# Patient Record
Sex: MALE | Race: WHITE | NOT HISPANIC OR LATINO | Employment: OTHER | URBAN - METROPOLITAN AREA
[De-identification: names, ages, dates, MRNs, and addresses within clinical notes are randomized per-mention and may not be internally consistent; named-entity substitution may affect disease eponyms.]

---

## 2022-02-28 ENCOUNTER — OFFICE VISIT (OUTPATIENT)
Dept: FAMILY MEDICINE | Facility: CLINIC | Age: 52
End: 2022-02-28
Payer: MEDICARE

## 2022-02-28 VITALS
OXYGEN SATURATION: 99 % | RESPIRATION RATE: 18 BRPM | TEMPERATURE: 99 F | SYSTOLIC BLOOD PRESSURE: 110 MMHG | WEIGHT: 169 LBS | HEIGHT: 64 IN | HEART RATE: 66 BPM | BODY MASS INDEX: 28.85 KG/M2 | DIASTOLIC BLOOD PRESSURE: 70 MMHG

## 2022-02-28 DIAGNOSIS — R41.3 MEMORY IMPAIRMENT: ICD-10-CM

## 2022-02-28 DIAGNOSIS — Z79.899 ON LONG TERM DRUG THERAPY: ICD-10-CM

## 2022-02-28 DIAGNOSIS — K08.9 POOR DENTITION: ICD-10-CM

## 2022-02-28 DIAGNOSIS — E78.5 HYPERLIPIDEMIA, UNSPECIFIED HYPERLIPIDEMIA TYPE: ICD-10-CM

## 2022-02-28 DIAGNOSIS — Z12.11 SCREENING FOR MALIGNANT NEOPLASM OF COLON: ICD-10-CM

## 2022-02-28 DIAGNOSIS — Z00.00 WELLNESS EXAMINATION: Primary | ICD-10-CM

## 2022-02-28 DIAGNOSIS — Q90.9 DOWN SYNDROME: ICD-10-CM

## 2022-02-28 DIAGNOSIS — Z12.5 SCREENING FOR MALIGNANT NEOPLASM OF PROSTATE: ICD-10-CM

## 2022-02-28 PROCEDURE — 99386 PREV VISIT NEW AGE 40-64: CPT | Mod: S$GLB,,, | Performed by: FAMILY MEDICINE

## 2022-02-28 PROCEDURE — 99386 PR PREVENTIVE VISIT,NEW,40-64: ICD-10-PCS | Mod: S$GLB,,, | Performed by: FAMILY MEDICINE

## 2022-02-28 RX ORDER — ROSUVASTATIN CALCIUM 5 MG/1
TABLET, COATED ORAL
COMMUNITY
Start: 2022-02-18

## 2022-02-28 RX ORDER — ALLOPURINOL 100 MG/1
TABLET ORAL
COMMUNITY
Start: 2022-02-18

## 2022-02-28 RX ORDER — CHLORHEXIDINE GLUCONATE ORAL RINSE 1.2 MG/ML
15 SOLUTION DENTAL 2 TIMES DAILY
COMMUNITY

## 2022-02-28 NOTE — PROGRESS NOTES
Subjective:      Patient ID: John Guzmán is a 51 y.o. male.    Chief Complaint: Establish Care      HPI:  51-year-old white male past history of hyperlipidemia down syndrome who presents for initiation of care.  His previous doctor is retiring.  He needs to find a new doctor.  He is without complaints.  His mother does report some memory loss.  They have noticed some problems remembering over the last 3 years.  Will repeat himself.  He did not recognize her while he was at work recently.  Seems to be gradually getting worse.  He needs refill of some meds.  He thinks he takes allopurinol for kidney problems.  He has never had a colonoscopy.  He denies any problems urinating but mother reports he does not complain a lot.    Past Medical History:   Diagnosis Date    Gout, unspecified     Hyperlipidemia     Unspecified viral hepatitis B without hepatic coma      Past Surgical History:   Procedure Laterality Date    FINGER AMPUTATION       Family History   Problem Relation Age of Onset    No Known Problems Mother     No Known Problems Father      Social History     Socioeconomic History    Marital status: Single   Tobacco Use    Smoking status: Never Smoker    Smokeless tobacco: Never Used   Substance and Sexual Activity    Alcohol use: Never    Drug use: Never     Review of patient's allergies indicates:  No Known Allergies    Review of Systems   Constitutional: Negative for activity change, appetite change, chills, fatigue, fever and unexpected weight change.   HENT: Negative for congestion, ear pain, rhinorrhea, sinus pressure, sinus pain, sneezing, sore throat and trouble swallowing.    Eyes: Negative for photophobia, pain and itching.   Respiratory: Negative for cough, chest tightness, shortness of breath and wheezing.    Cardiovascular: Negative for chest pain, palpitations and leg swelling.   Gastrointestinal: Negative for abdominal distention, abdominal pain, constipation, diarrhea, nausea and  "vomiting.   Endocrine: Negative for cold intolerance, heat intolerance, polydipsia and polyphagia.   Genitourinary: Negative for difficulty urinating, dysuria and frequency.   Musculoskeletal: Negative for arthralgias, joint swelling and myalgias.   Skin: Negative for pallor and rash.   Neurological: Negative for dizziness, seizures, syncope, speech difficulty and headaches.   Hematological: Negative for adenopathy. Does not bruise/bleed easily.   Psychiatric/Behavioral: Positive for decreased concentration. Negative for agitation, behavioral problems and hallucinations.       Objective:       /70 (BP Location: Left arm, Patient Position: Sitting, BP Method: Large (Manual))   Pulse 66   Temp 98.6 °F (37 °C) (Oral)   Resp 18   Ht 5' 4" (1.626 m)   Wt 76.7 kg (169 lb)   SpO2 99%   BMI 29.01 kg/m²   Physical Exam  Vitals and nursing note reviewed.   Constitutional:       Appearance: He is well-developed.   HENT:      Head: Normocephalic and atraumatic.      Nose: Nose normal.   Eyes:      Conjunctiva/sclera: Conjunctivae normal.      Pupils: Pupils are equal, round, and reactive to light.   Cardiovascular:      Rate and Rhythm: Normal rate and regular rhythm.      Heart sounds: Normal heart sounds.   Pulmonary:      Effort: Pulmonary effort is normal.      Breath sounds: Normal breath sounds.   Abdominal:      Palpations: Abdomen is soft.      Tenderness: There is no abdominal tenderness.   Musculoskeletal:         General: Normal range of motion.      Cervical back: Normal range of motion and neck supple.   Skin:     General: Skin is warm and dry.   Neurological:      Mental Status: He is alert and oriented to person, place, and time.   Psychiatric:         Behavior: Behavior normal.         Thought Content: Thought content normal.         Judgment: Judgment normal.         Assessment:     1. Wellness examination    2. Down syndrome    3. Hyperlipidemia, unspecified hyperlipidemia type    4. Poor " dentition    5. Screening for malignant neoplasm of prostate    6. Screening for malignant neoplasm of colon    7. On long term drug therapy    8. Memory impairment        Plan:   Wellness examination    Down syndrome    Hyperlipidemia, unspecified hyperlipidemia type  -     Comprehensive Metabolic Panel; Future; Expected date: 02/28/2022  -     Lipid Panel; Future; Expected date: 02/28/2022    Poor dentition    Screening for malignant neoplasm of prostate  -     PSA, Screening; Future; Expected date: 02/28/2022    Screening for malignant neoplasm of colon  -     Ambulatory referral/consult to General Surgery; Future; Expected date: 03/07/2022    On long term drug therapy  -     CBC Auto Differential; Future; Expected date: 02/28/2022  -     TSH; Future; Expected date: 02/28/2022  -     T4, Free; Future; Expected date: 02/28/2022  -     Comprehensive Metabolic Panel; Future; Expected date: 02/28/2022  -     Lipid Panel; Future; Expected date: 02/28/2022  -     Hemoglobin A1C; Future; Expected date: 02/28/2022    Memory impairment  -     CT Head Without Contrast; Future; Expected date: 02/28/2022      Will request old records to see if patient will need an echocardiogram or x-rays of his neck.    Order CT head.      Consider Aricept.    Will come in at later date for fasting labs.    Medication List with Changes/Refills   Current Medications    ALLOPURINOL (ZYLOPRIM) 100 MG TABLET        CHLORHEXIDINE (PERIDEX) 0.12 % SOLUTION    Use as directed 15 mLs in the mouth or throat 2 (two) times daily.    ROSUVASTATIN (CRESTOR) 5 MG TABLET                Disclaimer: This note may have been prepared using voice recognition software, it may have not been extensively proofed, as such there could be errors within the text such as sound alike errors.

## 2022-04-04 ENCOUNTER — TELEPHONE (OUTPATIENT)
Dept: FAMILY MEDICINE | Facility: CLINIC | Age: 52
End: 2022-04-04
Payer: MEDICARE

## 2022-04-04 NOTE — TELEPHONE ENCOUNTER
----- Message from Nandini Humaira sent at 4/4/2022 12:19 PM CDT -----  Type:  Needs Medical Advice    Who Called: John Guzmán  Symptoms (please be specific): -  How long has patient had these symptoms:  -  Pharmacy name and phone #:    Would the patient rather a call back or a response via [facility]?    Best Call Back Number: [phone] ( Jojo. Pt's mother)   Additional Information: calling to see if pt's meds have been refilled, please call

## 2023-12-28 ENCOUNTER — OFFICE VISIT (OUTPATIENT)
Dept: FAMILY MEDICINE | Facility: CLINIC | Age: 53
End: 2023-12-28
Payer: MEDICARE

## 2023-12-28 VITALS
WEIGHT: 179 LBS | RESPIRATION RATE: 18 BRPM | TEMPERATURE: 99 F | OXYGEN SATURATION: 99 % | DIASTOLIC BLOOD PRESSURE: 78 MMHG | BODY MASS INDEX: 30.56 KG/M2 | HEIGHT: 64 IN | HEART RATE: 77 BPM | SYSTOLIC BLOOD PRESSURE: 130 MMHG

## 2023-12-28 DIAGNOSIS — R60.0 LOCALIZED EDEMA: Primary | ICD-10-CM

## 2023-12-28 DIAGNOSIS — S80.11XA CONTUSION OF RIGHT LOWER LEG, INITIAL ENCOUNTER: ICD-10-CM

## 2023-12-28 DIAGNOSIS — R21 RASH: ICD-10-CM

## 2023-12-28 DIAGNOSIS — Z79.899 ON LONG TERM DRUG THERAPY: ICD-10-CM

## 2023-12-28 DIAGNOSIS — Q90.9 DOWN SYNDROME: ICD-10-CM

## 2023-12-28 PROCEDURE — 99214 OFFICE O/P EST MOD 30 MIN: CPT | Mod: S$GLB,,, | Performed by: FAMILY MEDICINE

## 2023-12-28 PROCEDURE — 99214 PR OFFICE/OUTPT VISIT, EST, LEVL IV, 30-39 MIN: ICD-10-PCS | Mod: S$GLB,,, | Performed by: FAMILY MEDICINE

## 2023-12-28 RX ORDER — BETAMETHASONE VALERATE 1.2 MG/G
CREAM TOPICAL 2 TIMES DAILY
Qty: 45 G | Refills: 0 | Status: SHIPPED | OUTPATIENT
Start: 2023-12-28

## 2023-12-28 RX ORDER — CEPHALEXIN 500 MG/1
500 CAPSULE ORAL EVERY 8 HOURS
Qty: 21 CAPSULE | Refills: 0 | Status: SHIPPED | OUTPATIENT
Start: 2023-12-28

## 2023-12-28 NOTE — PROGRESS NOTES
Subjective:      Patient ID: John Guzmán is a 53 y.o. male.    Chief Complaint: Follow-up (Right lower leg discoloration)      HPI: 53-year-old male who presents for right lower extremity rash.  Has been present for several months.  Denies any pain.  Mother noticed it.  Thought the home was going to bring him but they did not.  They have noticed some swelling.  Located just over the distal leg but not the foot.    Past Medical History:   Diagnosis Date    Gout, unspecified     Hyperlipidemia     Unspecified viral hepatitis B without hepatic coma      Past Surgical History:   Procedure Laterality Date    FINGER AMPUTATION       Family History   Problem Relation Age of Onset    No Known Problems Mother     No Known Problems Father      Social History     Socioeconomic History    Marital status: Single   Tobacco Use    Smoking status: Never    Smokeless tobacco: Never   Substance and Sexual Activity    Alcohol use: Never    Drug use: Never     Review of patient's allergies indicates:  No Known Allergies    Review of Systems   Constitutional:  Negative for activity change, appetite change, chills, fatigue, fever and unexpected weight change.   HENT:  Negative for congestion, ear pain, rhinorrhea, sinus pressure, sinus pain, sneezing, sore throat and trouble swallowing.    Eyes:  Negative for photophobia, pain and itching.   Respiratory:  Negative for cough, chest tightness, shortness of breath and wheezing.    Cardiovascular:  Negative for chest pain, palpitations and leg swelling.   Gastrointestinal:  Negative for abdominal distention, abdominal pain, constipation, diarrhea, nausea and vomiting.   Endocrine: Negative for cold intolerance, heat intolerance, polydipsia and polyphagia.   Genitourinary:  Negative for difficulty urinating, dysuria and frequency.   Musculoskeletal:  Negative for arthralgias, joint swelling and myalgias.   Skin:  Positive for rash. Negative for pallor.   Neurological:  Negative for  "dizziness, seizures, syncope, speech difficulty and headaches.   Hematological:  Negative for adenopathy. Does not bruise/bleed easily.   Psychiatric/Behavioral:  Negative for agitation, behavioral problems and hallucinations.        Objective:       /78 (BP Location: Left arm, Patient Position: Sitting, BP Method: Medium (Manual))   Pulse 77   Temp 98.6 °F (37 °C) (Oral)   Resp 18   Ht 5' 4" (1.626 m)   Wt 81.2 kg (179 lb)   SpO2 99%   BMI 30.73 kg/m²   Physical Exam  Vitals and nursing note reviewed.   Constitutional:       Appearance: He is well-developed.   HENT:      Head: Normocephalic and atraumatic.      Nose: Nose normal.   Eyes:      Conjunctiva/sclera: Conjunctivae normal.      Pupils: Pupils are equal, round, and reactive to light.   Cardiovascular:      Rate and Rhythm: Normal rate and regular rhythm.      Heart sounds: Normal heart sounds.   Pulmonary:      Effort: Pulmonary effort is normal.      Breath sounds: Normal breath sounds.   Abdominal:      Palpations: Abdomen is soft.      Tenderness: There is no abdominal tenderness.   Musculoskeletal:         General: Normal range of motion.      Cervical back: Normal range of motion and neck supple.      Comments:   Mild swelling noted over distal right lower extremity, no swelling noted over foot, appears have venous stasis dermatitis with some varicose veins   Skin:     General: Skin is warm and dry.   Neurological:      Mental Status: He is alert and oriented to person, place, and time.   Psychiatric:         Behavior: Behavior normal.         Thought Content: Thought content normal.         Judgment: Judgment normal.         Assessment:     1. Localized edema    2. Contusion of right lower leg, initial encounter    3. Down syndrome    4. On long term drug therapy    5. Rash        Plan:   Localized edema  -     VAS US Venous Leg Right; Future    Contusion of right lower leg, initial encounter  -     X-Ray Tibia Fibula 2 View Right; " Future; Expected date: 12/28/2023    Down syndrome  -     Echo; Future    On long term drug therapy  -     Hemoglobin A1C; Future; Expected date: 12/28/2023  -     CBC Auto Differential; Future; Expected date: 12/28/2023  -     TSH; Future; Expected date: 12/28/2023  -     T4, Free; Future; Expected date: 12/28/2023  -     Comprehensive Metabolic Panel; Future; Expected date: 12/28/2023  -     Lipid Panel; Future; Expected date: 12/28/2023    Rash  -     cephALEXin (KEFLEX) 500 MG capsule; Take 1 capsule (500 mg total) by mouth every 8 (eight) hours.  Dispense: 21 capsule; Refill: 0  -     betamethasone valerate 0.1% (VALISONE) 0.1 % Crea; Apply topically 2 (two) times daily.  Dispense: 45 g; Refill: 0       Trial of Keflex and betamethasone.      Labs pending.      Order echocardiogram.      Normal tib-fib.      Send for varicose vein evaluation.      Stat ultrasound was negative for DVT.    Medication List with Changes/Refills   New Medications    BETAMETHASONE VALERATE 0.1% (VALISONE) 0.1 % CREA    Apply topically 2 (two) times daily.    CEPHALEXIN (KEFLEX) 500 MG CAPSULE    Take 1 capsule (500 mg total) by mouth every 8 (eight) hours.   Current Medications    ALLOPURINOL (ZYLOPRIM) 100 MG TABLET        CHLORHEXIDINE (PERIDEX) 0.12 % SOLUTION    Use as directed 15 mLs in the mouth or throat 2 (two) times daily.    ROSUVASTATIN (CRESTOR) 5 MG TABLET                Disclaimer: This note may have been prepared using voice recognition software, it may have not been extensively proofed, as such there could be errors within the text such as sound alike errors.

## 2023-12-29 LAB
ABS NRBC COUNT: 0 X 10 3/UL (ref 0–0.01)
ABSOLUTE BASOPHIL: 0.05 X 10 3/UL (ref 0–0.22)
ABSOLUTE EOSINOPHIL: 0 X 10 3/UL (ref 0.04–0.54)
ABSOLUTE IMMATURE GRAN: 0.01 X 10 3/UL (ref 0–0.04)
ABSOLUTE LYMPHOCYTE: 1.83 X 10 3/UL (ref 0.86–4.75)
ABSOLUTE MONOCYTE: 0.36 X 10 3/UL (ref 0.22–1.08)
ALBUMIN SERPL-MCNC: 4.2 G/DL (ref 3.5–5.2)
ALBUMIN/GLOB SERPL ELPH: 1.5 {RATIO} (ref 1–2.7)
ALP ISOS SERPL LEV INH-CCNC: 82 U/L (ref 40–130)
ALT (SGPT): 48 U/L (ref 0–41)
ANION GAP SERPL CALC-SCNC: 11 MMOL/L (ref 8–17)
AST SERPL-CCNC: 36 U/L (ref 0–40)
BASOPHILS NFR BLD: 1 % (ref 0.2–1.2)
BILIRUBIN, TOTAL: 0.33 MG/DL (ref 0–1.2)
BUN/CREAT SERPL: 13.1 (ref 6–20)
CALCIUM SERPL-MCNC: 9.2 MG/DL (ref 8.6–10.2)
CARBON DIOXIDE, CO2: 29 MMOL/L (ref 22–29)
CHLORIDE: 103 MMOL/L (ref 98–107)
CHOLEST SERPL-MSCNC: 163 MG/DL (ref 100–200)
CREAT SERPL-MCNC: 1.25 MG/DL (ref 0.7–1.2)
EOSINOPHIL NFR BLD: 0 % (ref 0.7–7)
ESTIMATED AVERAGE GLUCOSE: 124 MG/DL
GFR ESTIMATION: 68.43 ML/MIN/1.73M2
GLOBULIN: 2.8 G/DL (ref 1.5–4.5)
GLUCOSE: 97 MG/DL (ref 74–106)
HBA1C MFR BLD: 5.9 % (ref 4–6)
HCT VFR BLD AUTO: 46.2 % (ref 42–52)
HDLC SERPL-MCNC: 52 MG/DL
HGB BLD-MCNC: 15.2 G/DL (ref 14–18)
IMMATURE GRANULOCYTES: 0.2 % (ref 0–0.5)
LDL/HDL RATIO: 1.7 (ref 1–3)
LDLC SERPL CALC-MCNC: 85.8 MG/DL (ref 0–100)
LYMPHOCYTES NFR BLD: 37.7 % (ref 19.3–53.1)
MCH RBC QN AUTO: 32.9 PG (ref 27–32)
MCHC RBC AUTO-ENTMCNC: 32.9 G/DL (ref 32–36)
MCV RBC AUTO: 100 FL (ref 80–94)
MONOCYTES NFR BLD: 7.4 % (ref 4.7–12.5)
NEUTROPHILS # BLD AUTO: 2.61 X 10 3/UL (ref 2.15–7.56)
NEUTROPHILS NFR BLD: 53.7 % (ref 34–71.1)
NUCLEATED RED BLOOD CELLS: 0 /100 WBC (ref 0–0.2)
PLATELET # BLD AUTO: 122 X 10 3/UL (ref 135–400)
POTASSIUM: 4.6 MMOL/L (ref 3.5–5.1)
PROT SNV-MCNC: 7 G/DL (ref 6.4–8.3)
RBC # BLD AUTO: 4.62 X 10 6/UL (ref 4.7–6.1)
RDW-SD: 52.2 FL (ref 37–54)
SODIUM: 143 MMOL/L (ref 136–145)
T4, FREE: 0.5 NG/DL (ref 0.93–1.7)
TRIGL SERPL-MCNC: 126 MG/DL (ref 0–150)
TSH SERPL DL<=0.005 MIU/L-ACNC: 5.37 UIU/ML (ref 0.27–4.2)
UREA NITROGEN (BUN): 16.4 MG/DL (ref 6–20)
WBC # BLD: 4.86 X 10 3/UL (ref 4.3–10.8)

## 2024-01-04 DIAGNOSIS — E03.9 HYPOTHYROIDISM, UNSPECIFIED TYPE: Primary | ICD-10-CM

## 2024-01-04 RX ORDER — LEVOTHYROXINE SODIUM 25 UG/1
25 TABLET ORAL
Qty: 30 TABLET | Refills: 3 | Status: SHIPPED | OUTPATIENT
Start: 2024-01-04 | End: 2025-01-03

## 2024-01-19 ENCOUNTER — PATIENT MESSAGE (OUTPATIENT)
Dept: FAMILY MEDICINE | Facility: CLINIC | Age: 54
End: 2024-01-19
Payer: MEDICARE

## 2024-02-20 ENCOUNTER — OFFICE VISIT (OUTPATIENT)
Dept: FAMILY MEDICINE | Facility: CLINIC | Age: 54
End: 2024-02-20
Payer: MEDICARE

## 2024-02-20 VITALS
DIASTOLIC BLOOD PRESSURE: 78 MMHG | OXYGEN SATURATION: 97 % | HEART RATE: 76 BPM | WEIGHT: 181.19 LBS | SYSTOLIC BLOOD PRESSURE: 116 MMHG | HEIGHT: 64 IN | BODY MASS INDEX: 30.93 KG/M2

## 2024-02-20 DIAGNOSIS — E78.5 HYPERLIPIDEMIA, UNSPECIFIED HYPERLIPIDEMIA TYPE: ICD-10-CM

## 2024-02-20 DIAGNOSIS — R60.9 EDEMA, UNSPECIFIED TYPE: ICD-10-CM

## 2024-02-20 DIAGNOSIS — R21 RASH: Primary | ICD-10-CM

## 2024-02-20 DIAGNOSIS — E03.9 HYPOTHYROIDISM, UNSPECIFIED TYPE: ICD-10-CM

## 2024-02-20 PROCEDURE — 99214 OFFICE O/P EST MOD 30 MIN: CPT | Mod: S$GLB,,, | Performed by: FAMILY MEDICINE

## 2024-02-20 RX ORDER — CLOBETASOL PROPIONATE 0.5 MG/G
CREAM TOPICAL 2 TIMES DAILY
Qty: 60 G | Refills: 1 | Status: SHIPPED | OUTPATIENT
Start: 2024-02-20

## 2024-02-21 NOTE — PROGRESS NOTES
Subjective:      Patient ID: John Guzmán is a 53 y.o. male.    Chief Complaint: Follow-up and Leg Pain      HPI:  53-year-old male who presents for continued lower extremity swelling and rash.  He does feel like the cream is helping.  Still has swelling.  He works in walks throughout the day.  They did not hear from the vascular surgeon.  He lives in [place].  Would be difficult to get transportation to another town.    Past Medical History:   Diagnosis Date    Gout, unspecified     Hyperlipidemia     Unspecified viral hepatitis B without hepatic coma      Past Surgical History:   Procedure Laterality Date    FINGER AMPUTATION       Family History   Problem Relation Age of Onset    No Known Problems Mother     No Known Problems Father      Social History     Socioeconomic History    Marital status: Single   Tobacco Use    Smoking status: Never    Smokeless tobacco: Never   Substance and Sexual Activity    Alcohol use: Never    Drug use: Never     Review of patient's allergies indicates:  No Known Allergies    Review of Systems   Constitutional:  Negative for activity change, appetite change, chills, fatigue, fever and unexpected weight change.   HENT:  Negative for congestion, ear pain, rhinorrhea, sinus pressure, sinus pain, sneezing, sore throat and trouble swallowing.    Eyes:  Negative for photophobia, pain and itching.   Respiratory:  Negative for cough, chest tightness, shortness of breath and wheezing.    Cardiovascular:  Negative for chest pain, palpitations and leg swelling.   Gastrointestinal:  Negative for abdominal distention, abdominal pain, constipation, diarrhea, nausea and vomiting.   Endocrine: Negative for cold intolerance, heat intolerance, polydipsia and polyphagia.   Genitourinary:  Negative for difficulty urinating, dysuria and frequency.   Musculoskeletal:  Negative for arthralgias, joint swelling and myalgias.   Skin:  Positive for rash. Negative for pallor.   Neurological:  Negative for  "dizziness, seizures, syncope, speech difficulty and headaches.   Hematological:  Negative for adenopathy. Does not bruise/bleed easily.   Psychiatric/Behavioral:  Negative for agitation, behavioral problems and hallucinations.        Objective:       /78 (BP Location: Left arm, Patient Position: Sitting, BP Method: Medium (Manual))   Pulse 76   Ht 5' 4" (1.626 m)   Wt 82.2 kg (181 lb 3.2 oz)   SpO2 97%   BMI 31.10 kg/m²   Physical Exam  Vitals and nursing note reviewed.   Constitutional:       Appearance: He is well-developed.   HENT:      Head: Normocephalic and atraumatic.      Nose: Nose normal.   Eyes:      Conjunctiva/sclera: Conjunctivae normal.      Pupils: Pupils are equal, round, and reactive to light.   Cardiovascular:      Rate and Rhythm: Normal rate and regular rhythm.      Heart sounds: Normal heart sounds.   Pulmonary:      Effort: Pulmonary effort is normal.      Breath sounds: Normal breath sounds.   Abdominal:      Palpations: Abdomen is soft.      Tenderness: There is no abdominal tenderness.   Musculoskeletal:         General: Normal range of motion.      Cervical back: Normal range of motion and neck supple.      Comments:   Mild swelling noted over distal right lower extremity, no swelling noted over foot, appears have venous stasis dermatitis with some varicose veins    Appears to have some early changes in his left leg as well   Skin:     General: Skin is warm and dry.   Neurological:      Mental Status: He is alert and oriented to person, place, and time.   Psychiatric:         Behavior: Behavior normal.         Thought Content: Thought content normal.         Judgment: Judgment normal.         Assessment:     1. Rash    2. Hypothyroidism, unspecified type    3. Edema, unspecified type    4. Hyperlipidemia, unspecified hyperlipidemia type        Plan:   Rash  -     clobetasoL (TEMOVATE) 0.05 % cream; Apply topically 2 (two) times daily.  Dispense: 60 g; Refill: " 1    Hypothyroidism, unspecified type    Edema, unspecified type    Hyperlipidemia, unspecified hyperlipidemia type      Discussed echo.      Has been taking thyroid meds.  Will plan for repeat blood work on follow-up.      Trial of clobetasol.      Elevate legs.      Compression socks throughout the day.      Hold off on vascular surgery evaluation at this time given transportation issues.          Medication List with Changes/Refills   New Medications    CLOBETASOL (TEMOVATE) 0.05 % CREAM    Apply topically 2 (two) times daily.   Current Medications    ALLOPURINOL (ZYLOPRIM) 100 MG TABLET        BETAMETHASONE VALERATE 0.1% (VALISONE) 0.1 % CREA    Apply topically 2 (two) times daily.    CEPHALEXIN (KEFLEX) 500 MG CAPSULE    Take 1 capsule (500 mg total) by mouth every 8 (eight) hours.    CHLORHEXIDINE (PERIDEX) 0.12 % SOLUTION    Use as directed 15 mLs in the mouth or throat 2 (two) times daily.    LEVOTHYROXINE (SYNTHROID) 25 MCG TABLET    Take 1 tablet (25 mcg total) by mouth before breakfast.    ROSUVASTATIN (CRESTOR) 5 MG TABLET                Disclaimer: This note may have been prepared using voice recognition software, it may have not been extensively proofed, as such there could be errors within the text such as sound alike errors.

## 2024-03-19 ENCOUNTER — OFFICE VISIT (OUTPATIENT)
Dept: FAMILY MEDICINE | Facility: CLINIC | Age: 54
End: 2024-03-19
Payer: MEDICARE

## 2024-03-19 VITALS
OXYGEN SATURATION: 99 % | BODY MASS INDEX: 30.35 KG/M2 | HEART RATE: 57 BPM | HEIGHT: 64 IN | DIASTOLIC BLOOD PRESSURE: 76 MMHG | WEIGHT: 177.81 LBS | SYSTOLIC BLOOD PRESSURE: 98 MMHG

## 2024-03-19 DIAGNOSIS — E03.9 HYPOTHYROIDISM, UNSPECIFIED TYPE: ICD-10-CM

## 2024-03-19 DIAGNOSIS — I87.2 VENOUS STASIS DERMATITIS, UNSPECIFIED LATERALITY: ICD-10-CM

## 2024-03-19 DIAGNOSIS — N17.9 AKI (ACUTE KIDNEY INJURY): ICD-10-CM

## 2024-03-19 DIAGNOSIS — E03.9 HYPOTHYROIDISM, UNSPECIFIED TYPE: Primary | ICD-10-CM

## 2024-03-19 LAB
ANION GAP SERPL CALC-SCNC: 7 MMOL/L (ref 8–17)
BUN/CREAT SERPL: 14 (ref 6–20)
CALCIUM SERPL-MCNC: 8.7 MG/DL (ref 8.6–10.2)
CARBON DIOXIDE, CO2: 30 MMOL/L (ref 22–29)
CHLORIDE: 103 MMOL/L (ref 98–107)
CREAT SERPL-MCNC: 1.18 MG/DL (ref 0.7–1.2)
GFR ESTIMATION: 73.33 ML/MIN/1.73M2
GLUCOSE: 102 MG/DL (ref 74–106)
POTASSIUM: 5 MMOL/L (ref 3.5–5.1)
SODIUM: 140 MMOL/L (ref 136–145)
T4, FREE: 0.49 NG/DL (ref 0.93–1.7)
TSH SERPL DL<=0.005 MIU/L-ACNC: 3.57 UIU/ML (ref 0.27–4.2)
UREA NITROGEN (BUN): 16.5 MG/DL (ref 6–20)

## 2024-03-19 PROCEDURE — 99214 OFFICE O/P EST MOD 30 MIN: CPT | Mod: S$GLB,,, | Performed by: FAMILY MEDICINE

## 2024-03-19 RX ORDER — LEVOTHYROXINE SODIUM 50 UG/1
50 TABLET ORAL
Qty: 30 TABLET | Refills: 3 | Status: SHIPPED | OUTPATIENT
Start: 2024-03-19 | End: 2025-03-19

## 2024-03-19 NOTE — PROGRESS NOTES
Subjective:      Patient ID: John Guzmán is a 53 y.o. male.    Chief Complaint: Follow-up      HPI:  53-year-old male who presents for chronic med management.  Feels like compression socks help with the swelling.  Feels like the rash is getting better on his legs.  No other complaints.    Past Medical History:   Diagnosis Date    Gout, unspecified     Hyperlipidemia     Unspecified viral hepatitis B without hepatic coma      Past Surgical History:   Procedure Laterality Date    FINGER AMPUTATION       Family History   Problem Relation Age of Onset    No Known Problems Mother     No Known Problems Father      Social History     Socioeconomic History    Marital status: Single   Tobacco Use    Smoking status: Never    Smokeless tobacco: Never   Substance and Sexual Activity    Alcohol use: Never    Drug use: Never     Review of patient's allergies indicates:  No Known Allergies    Review of Systems   Constitutional:  Negative for activity change, appetite change, chills, fatigue, fever and unexpected weight change.   HENT:  Negative for congestion, ear pain, rhinorrhea, sinus pressure, sinus pain, sneezing, sore throat and trouble swallowing.    Eyes:  Negative for photophobia, pain and itching.   Respiratory:  Negative for cough, chest tightness, shortness of breath and wheezing.    Cardiovascular:  Positive for leg swelling. Negative for chest pain and palpitations.   Gastrointestinal:  Negative for abdominal distention, abdominal pain, constipation, diarrhea, nausea and vomiting.   Endocrine: Negative for cold intolerance, heat intolerance, polydipsia and polyphagia.   Genitourinary:  Negative for difficulty urinating, dysuria and frequency.   Musculoskeletal:  Negative for arthralgias, joint swelling and myalgias.   Skin:  Positive for rash. Negative for pallor.   Neurological:  Negative for dizziness, seizures, syncope, speech difficulty and headaches.   Hematological:  Negative for adenopathy. Does not  "bruise/bleed easily.   Psychiatric/Behavioral:  Negative for agitation, behavioral problems and hallucinations.        Objective:       BP 98/76 (BP Location: Left arm, Patient Position: Sitting, BP Method: Large (Manual))   Pulse (!) 57   Ht 5' 4" (1.626 m)   Wt 80.6 kg (177 lb 12.8 oz)   SpO2 99%   BMI 30.52 kg/m²   Physical Exam  Vitals and nursing note reviewed.   Constitutional:       Appearance: He is well-developed.   HENT:      Head: Normocephalic and atraumatic.      Nose: Nose normal.   Eyes:      Conjunctiva/sclera: Conjunctivae normal.      Pupils: Pupils are equal, round, and reactive to light.   Cardiovascular:      Rate and Rhythm: Normal rate and regular rhythm.      Heart sounds: Normal heart sounds.   Pulmonary:      Effort: Pulmonary effort is normal.      Breath sounds: Normal breath sounds.   Abdominal:      Palpations: Abdomen is soft.      Tenderness: There is no abdominal tenderness.   Musculoskeletal:         General: Normal range of motion.      Cervical back: Normal range of motion and neck supple.      Comments:   Mild swelling noted over distal right lower extremity, no swelling noted over foot, appears have venous stasis dermatitis with some varicose veins    Appears to have some early changes in his left leg as well   Skin:     General: Skin is warm and dry.   Neurological:      Mental Status: He is alert and oriented to person, place, and time.   Psychiatric:         Behavior: Behavior normal.         Thought Content: Thought content normal.         Judgment: Judgment normal.         Assessment:     1. Hypothyroidism, unspecified type    2. ERMA (acute kidney injury)    3. Venous stasis dermatitis, unspecified laterality        Plan:   Hypothyroidism, unspecified type  -     TSH and Free T4; Future; Expected date: 03/19/2024    ERMA (acute kidney injury)  -     Basic Metabolic Panel; Future; Expected date: 03/19/2024    Venous stasis dermatitis, unspecified laterality  -     " Ambulatory referral/consult to Dermatology; Future; Expected date: 03/26/2024      Adjust Synthroid based on lab findings.      Repeat BMP pending.      Refer to dermatology.      Continue clobetasol for another week then moved to lotion twice a day.      Continue compression socks.      Follow-up in 4 months.  Sooner if needed    Medication List with Changes/Refills   Current Medications    ALLOPURINOL (ZYLOPRIM) 100 MG TABLET        BETAMETHASONE VALERATE 0.1% (VALISONE) 0.1 % CREA    Apply topically 2 (two) times daily.    CEPHALEXIN (KEFLEX) 500 MG CAPSULE    Take 1 capsule (500 mg total) by mouth every 8 (eight) hours.    CHLORHEXIDINE (PERIDEX) 0.12 % SOLUTION    Use as directed 15 mLs in the mouth or throat 2 (two) times daily.    CLOBETASOL (TEMOVATE) 0.05 % CREAM    Apply topically 2 (two) times daily.    LEVOTHYROXINE (SYNTHROID) 25 MCG TABLET    Take 1 tablet (25 mcg total) by mouth before breakfast.    ROSUVASTATIN (CRESTOR) 5 MG TABLET                Disclaimer: This note may have been prepared using voice recognition software, it may have not been extensively proofed, as such there could be errors within the text such as sound alike errors.

## 2024-04-15 ENCOUNTER — PATIENT MESSAGE (OUTPATIENT)
Dept: FAMILY MEDICINE | Facility: CLINIC | Age: 54
End: 2024-04-15
Payer: MEDICARE

## 2024-04-15 DIAGNOSIS — I87.2 STASIS DERMATITIS: Primary | ICD-10-CM

## 2024-04-22 ENCOUNTER — OFFICE VISIT (OUTPATIENT)
Dept: FAMILY MEDICINE | Facility: CLINIC | Age: 54
End: 2024-04-22
Payer: MEDICARE

## 2024-04-22 VITALS
HEART RATE: 60 BPM | WEIGHT: 180 LBS | OXYGEN SATURATION: 97 % | RESPIRATION RATE: 20 BRPM | DIASTOLIC BLOOD PRESSURE: 70 MMHG | BODY MASS INDEX: 30.73 KG/M2 | SYSTOLIC BLOOD PRESSURE: 108 MMHG | HEIGHT: 64 IN

## 2024-04-22 DIAGNOSIS — I87.2 VENOUS STASIS DERMATITIS: ICD-10-CM

## 2024-04-22 DIAGNOSIS — I87.8 VENOUS STASIS: Primary | ICD-10-CM

## 2024-04-22 DIAGNOSIS — E03.9 HYPOTHYROIDISM, UNSPECIFIED TYPE: ICD-10-CM

## 2024-04-22 PROCEDURE — 99214 OFFICE O/P EST MOD 30 MIN: CPT | Mod: S$GLB,,, | Performed by: FAMILY MEDICINE

## 2024-04-22 NOTE — PROGRESS NOTES
Subjective:      Patient ID: John Guzmán is a 53 y.o. male.    Chief Complaint: Follow-up      HPI:  53-year-old male who presents for rash.  Located on leg.  Not wearing compression socks.  He does think the cream is helping.  Has not heard from specialist.  Worried it maybe getting a little worse.  Ready go to work today.  He is accompanied by his caregiver who offers the history in addition to patient    Past Medical History:   Diagnosis Date    Gout, unspecified     Hyperlipidemia     Unspecified viral hepatitis B without hepatic coma      Past Surgical History:   Procedure Laterality Date    FINGER AMPUTATION       Family History   Problem Relation Name Age of Onset    No Known Problems Mother      No Known Problems Father       Social History     Socioeconomic History    Marital status: Single   Tobacco Use    Smoking status: Never    Smokeless tobacco: Never   Substance and Sexual Activity    Alcohol use: Never    Drug use: Never     Review of patient's allergies indicates:  No Known Allergies    Review of Systems   Constitutional:  Negative for activity change, appetite change, chills, fatigue, fever and unexpected weight change.   HENT:  Negative for congestion, ear pain, rhinorrhea, sinus pressure, sinus pain, sneezing, sore throat and trouble swallowing.    Eyes:  Negative for photophobia, pain and itching.   Respiratory:  Negative for cough, chest tightness, shortness of breath and wheezing.    Cardiovascular:  Positive for leg swelling. Negative for chest pain and palpitations.   Gastrointestinal:  Negative for abdominal distention, abdominal pain, constipation, diarrhea, nausea and vomiting.   Endocrine: Negative for cold intolerance, heat intolerance, polydipsia and polyphagia.   Genitourinary:  Negative for difficulty urinating, dysuria and frequency.   Musculoskeletal:  Negative for arthralgias, joint swelling and myalgias.   Skin:  Positive for rash. Negative for pallor.   Neurological:  Negative  "for dizziness, seizures, syncope, speech difficulty and headaches.   Hematological:  Negative for adenopathy. Does not bruise/bleed easily.   Psychiatric/Behavioral:  Negative for agitation, behavioral problems and hallucinations.        Objective:       /70   Pulse 60   Resp 20   Ht 5' 4" (1.626 m)   Wt 81.6 kg (180 lb)   SpO2 97%   BMI 30.90 kg/m²   Physical Exam  Vitals and nursing note reviewed.   Constitutional:       Appearance: He is well-developed.   HENT:      Head: Normocephalic and atraumatic.      Nose: Nose normal.   Eyes:      Conjunctiva/sclera: Conjunctivae normal.      Pupils: Pupils are equal, round, and reactive to light.   Cardiovascular:      Rate and Rhythm: Normal rate and regular rhythm.      Heart sounds: Normal heart sounds.   Pulmonary:      Effort: Pulmonary effort is normal.      Breath sounds: Normal breath sounds.   Abdominal:      Palpations: Abdomen is soft.      Tenderness: There is no abdominal tenderness.   Musculoskeletal:         General: Normal range of motion.      Cervical back: Normal range of motion and neck supple.      Comments:   Mild swelling noted over distal right lower extremity, no swelling noted over foot, appears have venous stasis dermatitis with some varicose veins    Appears to have some early changes in his left leg as well   Skin:     General: Skin is warm and dry.   Neurological:      Mental Status: He is alert and oriented to person, place, and time.   Psychiatric:         Behavior: Behavior normal.         Thought Content: Thought content normal.         Judgment: Judgment normal.         Assessment:     1. Venous stasis    2. Hypothyroidism, unspecified type    3. Venous stasis dermatitis        Plan:   Venous stasis  -     Ambulatory referral/consult to Vascular Surgery; Future; Expected date: 04/29/2024    Hypothyroidism, unspecified type    Venous stasis dermatitis  -     Ambulatory referral/consult to Vascular Surgery; Future; Expected " date: 04/29/2024      Appears to have worsening varicose veins in right leg.  Suspect this is related to his varicosities.  Recent vascular surgery.      Recent Dermatology.      Continue current dose of thyroid medicine.  He recently increase.      Plan to repeat labs on follow-up.  Wear compression socks    Medication List with Changes/Refills   Current Medications    ALLOPURINOL (ZYLOPRIM) 100 MG TABLET        BETAMETHASONE VALERATE 0.1% (VALISONE) 0.1 % CREA    Apply topically 2 (two) times daily.    CEPHALEXIN (KEFLEX) 500 MG CAPSULE    Take 1 capsule (500 mg total) by mouth every 8 (eight) hours.    CHLORHEXIDINE (PERIDEX) 0.12 % SOLUTION    Use as directed 15 mLs in the mouth or throat 2 (two) times daily.    CLOBETASOL (TEMOVATE) 0.05 % CREAM    Apply topically 2 (two) times daily.    LEVOTHYROXINE (SYNTHROID) 50 MCG TABLET    Take 1 tablet (50 mcg total) by mouth before breakfast.    ROSUVASTATIN (CRESTOR) 5 MG TABLET                Disclaimer: This note may have been prepared using voice recognition software, it may have not been extensively proofed, as such there could be errors within the text such as sound alike errors.

## 2024-07-11 DIAGNOSIS — E03.9 HYPOTHYROIDISM, UNSPECIFIED TYPE: ICD-10-CM

## 2024-07-11 RX ORDER — LEVOTHYROXINE SODIUM 50 UG/1
50 TABLET ORAL EVERY MORNING
Qty: 30 TABLET | Refills: 3 | Status: SHIPPED | OUTPATIENT
Start: 2024-07-11

## 2024-10-14 DIAGNOSIS — E03.9 HYPOTHYROIDISM, UNSPECIFIED TYPE: ICD-10-CM

## 2024-10-14 RX ORDER — LEVOTHYROXINE SODIUM 50 UG/1
50 TABLET ORAL EVERY MORNING
Qty: 30 TABLET | Refills: 1 | Status: SHIPPED | OUTPATIENT
Start: 2024-10-14

## 2024-11-11 DIAGNOSIS — E03.9 HYPOTHYROIDISM, UNSPECIFIED TYPE: ICD-10-CM

## 2024-11-11 RX ORDER — LEVOTHYROXINE SODIUM 50 UG/1
50 TABLET ORAL EVERY MORNING
Qty: 30 TABLET | Refills: 3 | Status: SHIPPED | OUTPATIENT
Start: 2024-11-11

## 2024-11-22 ENCOUNTER — OFFICE VISIT (OUTPATIENT)
Dept: FAMILY MEDICINE | Facility: CLINIC | Age: 54
End: 2024-11-22
Payer: MEDICARE

## 2024-11-22 VITALS
HEART RATE: 58 BPM | SYSTOLIC BLOOD PRESSURE: 106 MMHG | OXYGEN SATURATION: 99 % | DIASTOLIC BLOOD PRESSURE: 68 MMHG | BODY MASS INDEX: 28.54 KG/M2 | WEIGHT: 167.19 LBS | RESPIRATION RATE: 20 BRPM | TEMPERATURE: 96 F | HEIGHT: 64 IN

## 2024-11-22 DIAGNOSIS — Z79.899 ON LONG TERM DRUG THERAPY: ICD-10-CM

## 2024-11-22 DIAGNOSIS — E03.9 HYPOTHYROIDISM, UNSPECIFIED TYPE: ICD-10-CM

## 2024-11-22 DIAGNOSIS — R41.3 OTHER AMNESIA: ICD-10-CM

## 2024-11-22 DIAGNOSIS — R41.0 CONFUSION: ICD-10-CM

## 2024-11-22 DIAGNOSIS — B19.10 HEPATITIS B INFECTION WITHOUT DELTA AGENT WITHOUT HEPATIC COMA, UNSPECIFIED CHRONICITY: ICD-10-CM

## 2024-11-22 DIAGNOSIS — R45.1 AGITATION: Primary | ICD-10-CM

## 2024-11-22 DIAGNOSIS — Z23 NEED FOR VACCINATION: ICD-10-CM

## 2024-11-22 DIAGNOSIS — R41.3 MEMORY LOSS: ICD-10-CM

## 2024-11-22 DIAGNOSIS — E78.5 HYPERLIPIDEMIA, UNSPECIFIED HYPERLIPIDEMIA TYPE: ICD-10-CM

## 2024-11-22 DIAGNOSIS — Q90.9 DOWN SYNDROME: ICD-10-CM

## 2024-11-22 LAB
ABS NRBC COUNT: 0 X 10 3/UL (ref 0–0.01)
ABSOLUTE BASOPHIL: 0.03 X 10 3/UL (ref 0–0.22)
ABSOLUTE EOSINOPHIL: 0.01 X 10 3/UL (ref 0.04–0.54)
ABSOLUTE IMMATURE GRAN: 0 X 10 3/UL (ref 0–0.04)
ABSOLUTE LYMPHOCYTE: 0.82 X 10 3/UL (ref 0.86–4.75)
ABSOLUTE MONOCYTE: 0.32 X 10 3/UL (ref 0.22–1.08)
ALBUMIN SERPL-MCNC: 3.8 G/DL (ref 3.5–5.2)
ALBUMIN/GLOB SERPL ELPH: 1.3 {RATIO} (ref 1–2.7)
ALP ISOS SERPL LEV INH-CCNC: 89 U/L (ref 40–130)
ALT (SGPT): 55 U/L (ref 0–41)
ANION GAP SERPL CALC-SCNC: 11 MMOL/L (ref 8–17)
AST SERPL-CCNC: 46 U/L (ref 0–40)
BASOPHILS NFR BLD: 0.8 % (ref 0.2–1.2)
BILIRUBIN, TOTAL: 0.37 MG/DL (ref 0–1.2)
BUN/CREAT SERPL: 12.3 (ref 6–20)
CALCIUM SERPL-MCNC: 8.9 MG/DL (ref 8.6–10.2)
CARBON DIOXIDE, CO2: 26 MMOL/L (ref 22–29)
CHLORIDE: 105 MMOL/L (ref 98–107)
CHOLEST SERPL-MSCNC: 146 MG/DL (ref 100–200)
CREAT SERPL-MCNC: 1.32 MG/DL (ref 0.7–1.2)
EOSINOPHIL NFR BLD: 0.3 % (ref 0.7–7)
ESTIMATED AVERAGE GLUCOSE: 121 MG/DL (ref 70–126)
GFR ESTIMATION: 63.7 ML/MIN/1.73M2
GLOBULIN: 3 G/DL (ref 1.5–4.5)
GLUCOSE: 56 MG/DL (ref 74–106)
HBA1C MFR BLD: 5.8 % (ref 4–6)
HBV CORE IGM SERPL QL IA: REACTIVE
HBV SURFACE AG SERPL QL IA: REACTIVE
HCT VFR BLD AUTO: 44.9 % (ref 42–52)
HCV IGG SERPL QL IA: NONREACTIVE
HDLC SERPL-MCNC: 68 MG/DL
HEP BSAG CONFIRMATION: ABNORMAL
HEPATITIS A IGM: NONREACTIVE
HGB BLD-MCNC: 15.1 G/DL (ref 14–18)
IMMATURE GRANULOCYTES: 0 % (ref 0–0.5)
LDL/HDL RATIO: 0.9 (ref 1–3)
LDLC SERPL CALC-MCNC: 59.8 MG/DL (ref 0–100)
LYMPHOCYTES NFR BLD: 21.5 % (ref 19.3–53.1)
MCH RBC QN AUTO: 33.5 PG (ref 27–32)
MCHC RBC AUTO-ENTMCNC: 33.6 G/DL (ref 32–36)
MCV RBC AUTO: 99.6 FL (ref 80–94)
MONOCYTES NFR BLD: 8.4 % (ref 4.7–12.5)
NEUTROPHILS # BLD AUTO: 2.64 X 10 3/UL (ref 2.15–7.56)
NEUTROPHILS NFR BLD: 69 % (ref 34–71.1)
NUCLEATED RED BLOOD CELLS: 0 /100 WBC (ref 0–0.2)
PLATELET # BLD AUTO: 126 X 10 3/UL (ref 135–400)
POTASSIUM: 4.5 MMOL/L (ref 3.5–5.1)
PROT SNV-MCNC: 6.8 G/DL (ref 6.4–8.3)
RBC # BLD AUTO: 4.51 X 10 6/UL (ref 4.7–6.1)
RDW-SD: 51.4 FL (ref 37–54)
SODIUM: 142 MMOL/L (ref 136–145)
T4, FREE: 0.6 NG/DL (ref 0.93–1.7)
TRIGL SERPL-MCNC: 91 MG/DL (ref 0–150)
TSH SERPL DL<=0.005 MIU/L-ACNC: 1.92 UIU/ML (ref 0.27–4.2)
UREA NITROGEN (BUN): 16.3 MG/DL (ref 6–20)
WBC # BLD: 3.82 X 10 3/UL (ref 4.3–10.8)

## 2024-11-22 RX ORDER — ESCITALOPRAM OXALATE 5 MG/1
5 TABLET ORAL DAILY
Qty: 30 TABLET | Refills: 1 | Status: SHIPPED | OUTPATIENT
Start: 2024-11-22 | End: 2025-11-22

## 2024-11-22 NOTE — PROGRESS NOTES
Subjective:      Patient ID: John Guzmán is a 54 y.o. male.    Chief Complaint: Follow-up      HPI:  54-year-old male with down syndrome presents today with his mother.  Patient's mother states she is bringing him in today for a change in his mood.  Lately she has noticed more agitation.  This is not typically in his nature.  She has heard from his work place that this is also occurring at work.  Patient states he does not feel depressed or anxious.  However he would like to try some medication for mood.  Denies SI or HI.  Patient's mother also reports trouble with memory.  States she has noticed that he is repeating himself a lot.  Recently brought him home for the holidays.  After a few minutes he started packing his bag because he thought it was time to go back home.  She would like him to see Neurology.  He is ready for blood work today.  Has not seen an eye doctor.  She does report has a history of hep B. otherwise patient is doing well.  No other acute complaints at this time.    Past Medical History:   Diagnosis Date    Gout, unspecified     Hyperlipidemia     Unspecified viral hepatitis B without hepatic coma      Past Surgical History:   Procedure Laterality Date    FINGER AMPUTATION       Family History   Problem Relation Name Age of Onset    No Known Problems Mother      No Known Problems Father       Social History     Socioeconomic History    Marital status: Single   Tobacco Use    Smoking status: Never    Smokeless tobacco: Never   Substance and Sexual Activity    Alcohol use: Never    Drug use: Never     Review of patient's allergies indicates:  No Known Allergies    Review of Systems   Constitutional:  Negative for activity change, appetite change and fever.   HENT:  Negative for sinus pain.    Eyes:  Negative for visual disturbance.   Respiratory:  Negative for shortness of breath.    Cardiovascular:  Positive for leg swelling. Negative for chest pain.   Gastrointestinal:  Negative for abdominal  "pain.   Musculoskeletal:  Negative for arthralgias and myalgias.   Neurological:  Negative for dizziness and light-headedness.   Psychiatric/Behavioral:  Positive for agitation, confusion, decreased concentration and dysphoric mood. Negative for behavioral problems.        Objective:       /68 (BP Location: Left arm, Patient Position: Sitting)   Pulse (!) 58   Temp 96 °F (35.6 °C)   Resp 20   Ht 5' 4" (1.626 m)   Wt 75.8 kg (167 lb 3.2 oz)   SpO2 99%   BMI 28.70 kg/m²   Physical Exam  Vitals and nursing note reviewed.   Constitutional:       Appearance: He is well-developed.   HENT:      Head: Normocephalic and atraumatic.      Nose: Nose normal.   Eyes:      Conjunctiva/sclera: Conjunctivae normal.      Pupils: Pupils are equal, round, and reactive to light.   Cardiovascular:      Rate and Rhythm: Normal rate and regular rhythm.   Pulmonary:      Effort: Pulmonary effort is normal.   Abdominal:      General: Bowel sounds are normal.      Palpations: Abdomen is soft.      Tenderness: There is no abdominal tenderness.   Musculoskeletal:         General: Normal range of motion.      Cervical back: Normal range of motion and neck supple.      Comments:   Mild swelling noted over distal right lower extremity, no swelling noted over foot, appears have venous stasis dermatitis with some varicose veins    Appears to have some early changes in his left leg as well   Skin:     General: Skin is warm and dry.   Neurological:      Mental Status: He is alert and oriented to person, place, and time.   Psychiatric:         Behavior: Behavior normal.         Thought Content: Thought content normal.         Judgment: Judgment normal.         Assessment:     1. Agitation    2. Memory loss    3. Hypothyroidism, unspecified type    4. Hyperlipidemia, unspecified hyperlipidemia type    5. Down syndrome    6. Hepatitis B infection without delta agent without hepatic coma, unspecified chronicity    7. On long term drug " therapy    8. Confusion    9. Other amnesia    10. Need for vaccination        Plan:   Agitation  -     EScitalopram oxalate (LEXAPRO) 5 MG Tab; Take 1 tablet (5 mg total) by mouth once daily.  Dispense: 30 tablet; Refill: 1    Memory loss  -     Ambulatory referral/consult to Neurology; Future; Expected date: 11/29/2024  -     MRI Brain Without Contrast; Future; Expected date: 11/22/2024    Hypothyroidism, unspecified type  -     T4, Free; Future; Expected date: 11/22/2024  -     TSH; Future; Expected date: 11/22/2024    Hyperlipidemia, unspecified hyperlipidemia type  -     Lipid Panel; Future; Expected date: 11/22/2024    Down syndrome  -     Ambulatory referral/consult to Neurology; Future; Expected date: 11/29/2024    Hepatitis B infection without delta agent without hepatic coma, unspecified chronicity  -     Hepatitis Panel, Acute; Future; Expected date: 11/22/2024  -     Hepatitis B Viral DNA by PCR, Qualitative; Future; Expected date: 11/22/2024    On long term drug therapy  -     CBC Auto Differential; Future; Expected date: 11/22/2024  -     Comprehensive Metabolic Panel; Future; Expected date: 11/22/2024  -     T4, Free; Future; Expected date: 11/22/2024  -     TSH; Future; Expected date: 11/22/2024  -     Lipid Panel; Future; Expected date: 11/22/2024  -     Hemoglobin A1C; Future; Expected date: 11/22/2024  -     Ambulatory referral/consult to Ophthalmology; Future; Expected date: 11/29/2024    Confusion  -     Ambulatory referral/consult to Neurology; Future; Expected date: 11/29/2024  -     MRI Brain Without Contrast; Future; Expected date: 11/22/2024    Other amnesia  -     MRI Brain Without Contrast; Future; Expected date: 11/22/2024    Need for vaccination  -     Influenza - Trivalent - PF (ADULT)      Trial of Lexapro.  Side effects discussed with patient and mother.  Both stated understanding.      Denies SI or HI.      Ophthalmology referral pending.      Per mother request neuro referral  pending.      Labs today.      MRI ordered.      Flu vaccine provided.  Patient tolerated well.  Medication List with Changes/Refills   New Medications    ESCITALOPRAM OXALATE (LEXAPRO) 5 MG TAB    Take 1 tablet (5 mg total) by mouth once daily.   Current Medications    ALLOPURINOL (ZYLOPRIM) 100 MG TABLET        BETAMETHASONE VALERATE 0.1% (VALISONE) 0.1 % CREA    Apply topically 2 (two) times daily.    CEPHALEXIN (KEFLEX) 500 MG CAPSULE    Take 1 capsule (500 mg total) by mouth every 8 (eight) hours.    CHLORHEXIDINE (PERIDEX) 0.12 % SOLUTION    Use as directed 15 mLs in the mouth or throat 2 (two) times daily.    CLOBETASOL (TEMOVATE) 0.05 % CREAM    Apply topically 2 (two) times daily.    LEVOTHYROXINE (SYNTHROID) 50 MCG TABLET    TAKE 1 TABLET BY MOUTH EVERY MORNING BEFORE BREAKFAST    ROSUVASTATIN (CRESTOR) 5 MG TABLET                  Disclaimer: This note may have been prepared using voice recognition software, it may have not been extensively proofed, as such there could be errors within the text such as sound alike errors.

## 2024-11-26 ENCOUNTER — OFFICE VISIT (OUTPATIENT)
Dept: FAMILY MEDICINE | Facility: CLINIC | Age: 54
End: 2024-11-26
Payer: MEDICARE

## 2024-11-26 DIAGNOSIS — G47.00 INSOMNIA, UNSPECIFIED TYPE: Primary | ICD-10-CM

## 2024-11-26 NOTE — PROGRESS NOTES
Established Patient - Audio Only Telehealth Visit     The patient location is:  Doctor's office  The chief complaint leading to consultation is:  Insomnia  Visit type: Virtual visit with audio only (telephone)  Total time spent with patient:  7 minute       The reason for the audio only service rather than synchronous audio and video virtual visit was related to technical difficulties or patient preference/necessity.     Each patient to whom I provide medical services by telemedicine is:  (1) informed of the relationship between the physician and patient and the respective role of any other health care provider with respect to management of the patient; and (2) notified that they may decline to receive medical services by telemedicine and may withdraw from such care at any time. Patient verbally consented to receive this service via voice-only telephone call.       HPI:  54-year-old male with down syndrome presents today with his mother via audio visit.  Patient's mother states patient has not been sleeping at night.  She recently brought him home for Thanksgiving.  Her has been noticed during the middle of the night the patient was up most of the night.  Patient's mother states she gave him a Tylenol p.m. last night and he did sleep much better.  They were also interested in trying melatonin.  Otherwise patient doing well.  No other acute complaints at this time.     Assessment and plan:  Recommended OTC melatonin.  Follow-up if no improvement.                        This service was not originating from a related E/M service provided within the previous 7 days nor will  to an E/M service or procedure within the next 24 hours or my soonest available appointment.  Prevailing standard of care was able to be met in this audio-only visit.

## 2024-11-27 ENCOUNTER — TELEPHONE (OUTPATIENT)
Dept: FAMILY MEDICINE | Facility: CLINIC | Age: 54
End: 2024-11-27
Payer: MEDICARE

## 2024-11-27 NOTE — TELEPHONE ENCOUNTER
Called to notify, no pa chung for return call      ----- Message from Julia Luna PA-C sent at 11/26/2024  7:19 PM CST -----  Hep B positive.  Waiting on confirmatory testing.    T4 low.  TSH normal.  Is he taking his thyroid medication daily?  If so could try increase.    Creatinine bumped.  Taking any NSAIDs/Tylenol?  If so, please hold.  Increase fluid intake.      LFTs slightly elevated.  Will continue to monitor.      Platelets stable.  WBC slightly low.  Will monitor.

## 2024-12-02 ENCOUNTER — TELEPHONE (OUTPATIENT)
Dept: FAMILY MEDICINE | Facility: CLINIC | Age: 54
End: 2024-12-02
Payer: MEDICARE

## 2024-12-06 ENCOUNTER — TELEPHONE (OUTPATIENT)
Dept: FAMILY MEDICINE | Facility: CLINIC | Age: 54
End: 2024-12-06
Payer: MEDICARE

## 2024-12-06 NOTE — TELEPHONE ENCOUNTER
S/w pt mother, Jojo to notify of lab results per Julia. Pt mother agreeable to Infectious disease referral. Provider notified.     Notified pt mother that Dr. Casas's office has been unable to contact regarding neuro referral, contact info for Dr. Casas given, pt mother instructed to call to schedule, understanding verbalized.

## 2024-12-09 DIAGNOSIS — B19.10 HEPATITIS B INFECTION WITHOUT DELTA AGENT WITHOUT HEPATIC COMA, UNSPECIFIED CHRONICITY: Primary | ICD-10-CM

## 2024-12-19 ENCOUNTER — OFFICE VISIT (OUTPATIENT)
Dept: FAMILY MEDICINE | Facility: CLINIC | Age: 54
End: 2024-12-19
Payer: MEDICARE

## 2024-12-19 VITALS
WEIGHT: 168.38 LBS | RESPIRATION RATE: 18 BRPM | TEMPERATURE: 99 F | HEART RATE: 58 BPM | OXYGEN SATURATION: 98 % | BODY MASS INDEX: 28.75 KG/M2 | SYSTOLIC BLOOD PRESSURE: 102 MMHG | HEIGHT: 64 IN | DIASTOLIC BLOOD PRESSURE: 62 MMHG

## 2024-12-19 DIAGNOSIS — G47.00 INSOMNIA, UNSPECIFIED TYPE: Primary | ICD-10-CM

## 2024-12-19 DIAGNOSIS — Z12.11 SCREENING FOR MALIGNANT NEOPLASM OF COLON: ICD-10-CM

## 2024-12-19 DIAGNOSIS — D69.6 THROMBOCYTOPENIA, UNSPECIFIED: ICD-10-CM

## 2024-12-19 DIAGNOSIS — E03.9 HYPOTHYROIDISM, UNSPECIFIED TYPE: ICD-10-CM

## 2024-12-19 DIAGNOSIS — R45.1 AGITATION: ICD-10-CM

## 2024-12-19 PROCEDURE — 99214 OFFICE O/P EST MOD 30 MIN: CPT | Mod: S$GLB,,, | Performed by: STUDENT IN AN ORGANIZED HEALTH CARE EDUCATION/TRAINING PROGRAM

## 2024-12-19 RX ORDER — LEVOTHYROXINE SODIUM 75 UG/1
75 TABLET ORAL EVERY MORNING
Qty: 30 TABLET | Refills: 1 | Status: SHIPPED | OUTPATIENT
Start: 2024-12-19

## 2024-12-19 RX ORDER — HYDROXYZINE HYDROCHLORIDE 25 MG/1
25 TABLET, FILM COATED ORAL NIGHTLY PRN
Qty: 30 TABLET | Refills: 1 | Status: SHIPPED | OUTPATIENT
Start: 2024-12-19

## 2024-12-19 NOTE — PROGRESS NOTES
Subjective:      Patient ID: John Guzmán is a 54 y.o. male.    Chief Complaint: Insomnia and Follow-up (New neuro referral, unable to get in with dank, would like to see dr bejarano in [place])      HPI:53 yo male presents today with his mother for difficulty sleeping. Pt stayed with his mother for thanksgiving. Mother states pt did not sleep through the night. Tried melatonin with no improvement. He has been taking his lexapro. Seems to be doing well with this. No side effects noted. Pts mother would like to review his recent labs. Are not able to get into neurology until next August. Would like referral to a different provider. Unsure if they would like to proceed with colonoscopy referral at this time.     Past Medical History:   Diagnosis Date    Gout, unspecified     Hyperlipidemia     Unspecified viral hepatitis B without hepatic coma      Past Surgical History:   Procedure Laterality Date    FINGER AMPUTATION       Family History   Problem Relation Name Age of Onset    No Known Problems Mother      No Known Problems Father       Social History     Socioeconomic History    Marital status: Single   Tobacco Use    Smoking status: Never    Smokeless tobacco: Never   Substance and Sexual Activity    Alcohol use: Never    Drug use: Never     Social Drivers of Health     Financial Resource Strain: Low Risk  (12/19/2024)    Overall Financial Resource Strain (CARDIA)     Difficulty of Paying Living Expenses: Not very hard   Food Insecurity: Patient Declined (12/19/2024)    Hunger Vital Sign     Worried About Running Out of Food in the Last Year: Patient declined     Ran Out of Food in the Last Year: Patient declined   Physical Activity: Insufficiently Active (12/19/2024)    Exercise Vital Sign     Days of Exercise per Week: 3 days     Minutes of Exercise per Session: 30 min   Stress: Stress Concern Present (12/19/2024)    [employer] - Occupational Stress Questionnaire     Feeling  "of Stress : To some extent   Housing Stability: Unknown (12/19/2024)    Housing Stability Vital Sign     Unable to Pay for Housing in the Last Year: No     Review of patient's allergies indicates:  No Known Allergies    Review of Systems   Constitutional:  Negative for activity change, appetite change, fatigue and fever.   HENT:  Negative for sinus pain.    Eyes:  Negative for visual disturbance.   Respiratory:  Negative for shortness of breath.    Cardiovascular:  Negative for chest pain.   Gastrointestinal:  Negative for abdominal pain.   Musculoskeletal:  Negative for arthralgias and myalgias.   Neurological:  Negative for dizziness and light-headedness.   Psychiatric/Behavioral:  Positive for sleep disturbance. Negative for behavioral problems and suicidal ideas.        Objective:       /62 (BP Location: Left arm, Patient Position: Sitting)   Pulse (!) 58   Temp 98.5 °F (36.9 °C) (Oral)   Resp 18   Ht 5' 4" (1.626 m)   Wt 76.4 kg (168 lb 6.4 oz)   SpO2 98%   BMI 28.91 kg/m²   Physical Exam  Vitals and nursing note reviewed.   Constitutional:       Appearance: Normal appearance. He is well-developed.   HENT:      Head: Normocephalic and atraumatic.      Nose: Nose normal. No congestion or rhinorrhea.      Mouth/Throat:      Pharynx: No oropharyngeal exudate or posterior oropharyngeal erythema.   Eyes:      Extraocular Movements: Extraocular movements intact.      Conjunctiva/sclera: Conjunctivae normal.      Pupils: Pupils are equal, round, and reactive to light.   Cardiovascular:      Rate and Rhythm: Normal rate and regular rhythm.      Heart sounds: Normal heart sounds.   Pulmonary:      Effort: Pulmonary effort is normal.      Breath sounds: Normal breath sounds.   Abdominal:      General: Bowel sounds are normal.      Palpations: Abdomen is soft.      Tenderness: There is no abdominal tenderness.   Musculoskeletal:         General: Normal range of motion.      Cervical back: Normal range of " motion and neck supple.   Skin:     General: Skin is warm and dry.   Neurological:      General: No focal deficit present.      Mental Status: He is alert and oriented to person, place, and time.   Psychiatric:         Mood and Affect: Mood normal.         Behavior: Behavior normal.         Thought Content: Thought content normal.         Judgment: Judgment normal.         Assessment:     1. Insomnia, unspecified type    2. Agitation    3. Hypothyroidism, unspecified type    4. Thrombocytopenia, unspecified    5. Screening for malignant neoplasm of colon        Plan:   Insomnia, unspecified type  -     hydrOXYzine HCL (ATARAX) 25 MG tablet; Take 1 tablet (25 mg total) by mouth nightly as needed (insomnia).  Dispense: 30 tablet; Refill: 1    Agitation    Hypothyroidism, unspecified type  -     levothyroxine (SYNTHROID) 75 MCG tablet; Take 1 tablet (75 mcg total) by mouth every morning.  Dispense: 30 tablet; Refill: 1    Thrombocytopenia, unspecified    Screening for malignant neoplasm of colon      Continue lexapro.     Trial of hydroxyzine. Side effects discussed with pt and mother.     Increase levothyroxine.     Reviewed recent labs with pt.     Will re-send neurology referral.     RTC in 4-6 weeks. Sooner if needed.   Medication List with Changes/Refills   New Medications    HYDROXYZINE HCL (ATARAX) 25 MG TABLET    Take 1 tablet (25 mg total) by mouth nightly as needed (insomnia).   Current Medications    ALLOPURINOL (ZYLOPRIM) 100 MG TABLET        BETAMETHASONE VALERATE 0.1% (VALISONE) 0.1 % CREA    Apply topically 2 (two) times daily.    CEPHALEXIN (KEFLEX) 500 MG CAPSULE    Take 1 capsule (500 mg total) by mouth every 8 (eight) hours.    CHLORHEXIDINE (PERIDEX) 0.12 % SOLUTION    Use as directed 15 mLs in the mouth or throat 2 (two) times daily.    CLOBETASOL (TEMOVATE) 0.05 % CREAM    Apply topically 2 (two) times daily.    ESCITALOPRAM OXALATE (LEXAPRO) 5 MG TAB    Take 1 tablet (5 mg total) by mouth once  daily.    ROSUVASTATIN (CRESTOR) 5 MG TABLET       Changed and/or Refilled Medications    Modified Medication Previous Medication    LEVOTHYROXINE (SYNTHROID) 75 MCG TABLET levothyroxine (SYNTHROID) 50 MCG tablet       Take 1 tablet (75 mcg total) by mouth every morning.    TAKE 1 TABLET BY MOUTH EVERY MORNING BEFORE BREAKFAST              Disclaimer: This note may have been prepared using voice recognition software, it may have not been extensively proofed, as such there could be errors within the text such as sound alike errors.

## 2025-01-08 DIAGNOSIS — G47.00 INSOMNIA, UNSPECIFIED TYPE: ICD-10-CM

## 2025-01-08 DIAGNOSIS — R45.1 AGITATION: ICD-10-CM

## 2025-01-08 DIAGNOSIS — E03.9 HYPOTHYROIDISM, UNSPECIFIED TYPE: ICD-10-CM

## 2025-01-08 RX ORDER — LEVOTHYROXINE SODIUM 75 UG/1
75 TABLET ORAL EVERY MORNING
Qty: 30 TABLET | Refills: 1 | Status: SHIPPED | OUTPATIENT
Start: 2025-01-08

## 2025-01-08 RX ORDER — ESCITALOPRAM OXALATE 5 MG/1
5 TABLET ORAL DAILY
Qty: 30 TABLET | Refills: 1 | Status: SHIPPED | OUTPATIENT
Start: 2025-01-08 | End: 2026-01-08

## 2025-01-08 RX ORDER — HYDROXYZINE HYDROCHLORIDE 25 MG/1
25 TABLET, FILM COATED ORAL NIGHTLY PRN
Qty: 30 TABLET | Refills: 1 | Status: SHIPPED | OUTPATIENT
Start: 2025-01-08

## 2025-01-17 ENCOUNTER — OFFICE VISIT (OUTPATIENT)
Dept: FAMILY MEDICINE | Facility: CLINIC | Age: 55
End: 2025-01-17
Payer: MEDICARE

## 2025-01-17 VITALS
HEART RATE: 80 BPM | WEIGHT: 168 LBS | RESPIRATION RATE: 18 BRPM | HEIGHT: 64 IN | BODY MASS INDEX: 28.68 KG/M2 | SYSTOLIC BLOOD PRESSURE: 80 MMHG | TEMPERATURE: 98 F | DIASTOLIC BLOOD PRESSURE: 62 MMHG | OXYGEN SATURATION: 97 %

## 2025-01-17 DIAGNOSIS — R53.1 WEAKNESS: Primary | ICD-10-CM

## 2025-01-17 PROCEDURE — 99499 UNLISTED E&M SERVICE: CPT | Mod: S$GLB,,, | Performed by: STUDENT IN AN ORGANIZED HEALTH CARE EDUCATION/TRAINING PROGRAM

## 2025-01-17 NOTE — PROGRESS NOTES
Patient presents today for weakness.  Unable to ambulate into clinic.  Blood pressure 80/62.  Rechecked when standing.  BP 70s over 50s when standing.  Complaining of headaches.  COVID and flu negative.  Patient is sent to ER for further evaluation.  Opted for private vehicle transfer.

## 2025-02-06 ENCOUNTER — TELEPHONE (OUTPATIENT)
Dept: FAMILY MEDICINE | Facility: CLINIC | Age: 55
End: 2025-02-06
Payer: MEDICARE

## 2025-02-06 NOTE — TELEPHONE ENCOUNTER
Call received from Benedict with [facility] to reconcile pt meds. States, pt has not used Chlorhexidine mouth wash since . Would like to know if med an be d/c from [facility]. If pt needs medication, new order will be needed, current order on file has . Provider notified via msg.

## 2025-02-10 ENCOUNTER — TELEPHONE (OUTPATIENT)
Dept: FAMILY MEDICINE | Facility: CLINIC | Age: 55
End: 2025-02-10
Payer: MEDICARE

## 2025-02-10 NOTE — TELEPHONE ENCOUNTER
Attempted to contact Benedict with [facility] to notify chlorhexidine mouthwash discontinued per Dr. Mendoza, no answer, no vm.

## 2025-02-11 DIAGNOSIS — E03.9 HYPOTHYROIDISM, UNSPECIFIED TYPE: ICD-10-CM

## 2025-02-11 DIAGNOSIS — R45.1 AGITATION: ICD-10-CM

## 2025-02-12 RX ORDER — ESCITALOPRAM OXALATE 5 MG/1
5 TABLET ORAL
Qty: 30 TABLET | Refills: 5 | Status: SHIPPED | OUTPATIENT
Start: 2025-02-12

## 2025-02-12 RX ORDER — LEVOTHYROXINE SODIUM 75 UG/1
75 TABLET ORAL EVERY MORNING
Qty: 30 TABLET | Refills: 5 | Status: SHIPPED | OUTPATIENT
Start: 2025-02-12

## 2025-02-27 ENCOUNTER — OFFICE VISIT (OUTPATIENT)
Dept: FAMILY MEDICINE | Facility: CLINIC | Age: 55
End: 2025-02-27
Payer: MEDICARE

## 2025-02-27 VITALS
HEIGHT: 64 IN | WEIGHT: 163.19 LBS | DIASTOLIC BLOOD PRESSURE: 70 MMHG | TEMPERATURE: 97 F | OXYGEN SATURATION: 98 % | HEART RATE: 56 BPM | BODY MASS INDEX: 27.86 KG/M2 | RESPIRATION RATE: 20 BRPM | SYSTOLIC BLOOD PRESSURE: 122 MMHG

## 2025-02-27 DIAGNOSIS — Q90.9 DOWN SYNDROME: Primary | ICD-10-CM

## 2025-02-27 DIAGNOSIS — E78.5 HYPERLIPIDEMIA, UNSPECIFIED HYPERLIPIDEMIA TYPE: ICD-10-CM

## 2025-02-27 DIAGNOSIS — R45.1 AGITATION: ICD-10-CM

## 2025-02-27 DIAGNOSIS — B19.10 HEPATITIS B INFECTION WITHOUT DELTA AGENT WITHOUT HEPATIC COMA, UNSPECIFIED CHRONICITY: ICD-10-CM

## 2025-02-27 PROCEDURE — 99213 OFFICE O/P EST LOW 20 MIN: CPT | Mod: S$GLB,,, | Performed by: STUDENT IN AN ORGANIZED HEALTH CARE EDUCATION/TRAINING PROGRAM

## 2025-02-28 NOTE — PROGRESS NOTES
"Subjective:      Patient ID: John Guzmán is a 54 y.o. male.    Chief Complaint: Follow-up      HPI:  54-year-old male presents today for follow-up of chronic medical conditions.  He is here with his caregiver today.  Patient and caregiver state he is doing much better since his last visit.  They did go to the emergency room.  Meds were changed.  Overall he has been back to his normal self.  Patient denies any worsening or abnormal confusion.  He does tend to repeat himself.  This seems to be his baseline.  Doing well with his current medications.  No refills are needed at this time.  They do need his 90 L form completed.  No other acute complaints at this time.    Past Medical History:   Diagnosis Date    Gout, unspecified     Hyperlipidemia     Unspecified viral hepatitis B without hepatic coma      Past Surgical History:   Procedure Laterality Date    FINGER AMPUTATION       Family History   Problem Relation Name Age of Onset    No Known Problems Mother      No Known Problems Father       Social History[1]  Review of patient's allergies indicates:  No Known Allergies    Review of Systems   Constitutional:  Negative for activity change, appetite change, fatigue, fever and unexpected weight change.   HENT:  Negative for congestion, postnasal drip, rhinorrhea and sinus pain.    Eyes:  Negative for visual disturbance.   Respiratory:  Negative for cough and shortness of breath.    Cardiovascular:  Negative for chest pain and palpitations.   Gastrointestinal:  Negative for abdominal pain.   Musculoskeletal:  Negative for arthralgias and myalgias.   Neurological:  Negative for dizziness and light-headedness.   Psychiatric/Behavioral:  Negative for behavioral problems, dysphoric mood and hallucinations.        Objective:       /70 (BP Location: Left forearm, Patient Position: Sitting)   Pulse (!) 56   Temp 97 °F (36.1 °C) (Oral)   Resp 20   Ht 5' 4" (1.626 m)   Wt 74 kg (163 lb 3.2 oz)   SpO2 98%   BMI 28.01 " kg/m²   Physical Exam  Vitals and nursing note reviewed.   Constitutional:       Appearance: Normal appearance. He is well-developed.   HENT:      Head: Normocephalic and atraumatic.      Nose: Nose normal. No congestion or rhinorrhea.      Mouth/Throat:      Pharynx: No oropharyngeal exudate or posterior oropharyngeal erythema.   Eyes:      Extraocular Movements: Extraocular movements intact.      Conjunctiva/sclera: Conjunctivae normal.      Pupils: Pupils are equal, round, and reactive to light.   Cardiovascular:      Rate and Rhythm: Normal rate and regular rhythm.      Heart sounds: Normal heart sounds.   Pulmonary:      Effort: Pulmonary effort is normal.      Breath sounds: Normal breath sounds.   Abdominal:      General: Bowel sounds are normal.      Palpations: Abdomen is soft.      Tenderness: There is no abdominal tenderness.   Musculoskeletal:         General: Normal range of motion.      Cervical back: Normal range of motion and neck supple.   Skin:     General: Skin is warm and dry.   Neurological:      General: No focal deficit present.      Mental Status: He is alert and oriented to person, place, and time.   Psychiatric:         Mood and Affect: Mood normal.         Behavior: Behavior normal.         Thought Content: Thought content normal.         Judgment: Judgment normal.         Assessment:     1. Down syndrome    2. Hepatitis B infection without delta agent without hepatic coma, unspecified chronicity    3. Hyperlipidemia, unspecified hyperlipidemia type    4. Agitation        Plan:   Down syndrome    Hepatitis B infection without delta agent without hepatic coma, unspecified chronicity    Hyperlipidemia, unspecified hyperlipidemia type    Agitation      Continue current meds.  Can call for refills when needed.      90 mL form completed.      Will request recent hospital records.      Continue follow-up with Neurology.      Follow-up in 3 months.  Sooner if needed.  Medication List with  Changes/Refills   Current Medications    ALLOPURINOL (ZYLOPRIM) 100 MG TABLET        BETAMETHASONE VALERATE 0.1% (VALISONE) 0.1 % CREA    Apply topically 2 (two) times daily.    CEPHALEXIN (KEFLEX) 500 MG CAPSULE    Take 1 capsule (500 mg total) by mouth every 8 (eight) hours.    CLOBETASOL (TEMOVATE) 0.05 % CREAM    Apply topically 2 (two) times daily.    ESCITALOPRAM OXALATE (LEXAPRO) 5 MG TAB    TAKE 1 TABLET BY MOUTH ONCE DAILY    HYDROXYZINE HCL (ATARAX) 25 MG TABLET    Take 1 tablet (25 mg total) by mouth nightly as needed (insomnia).    LEVOTHYROXINE (SYNTHROID) 75 MCG TABLET    TAKE 1 TABLET BY MOUTH EVERY MORNING BEFORE BREAKFAST    ROSUVASTATIN (CRESTOR) 5 MG TABLET                  Disclaimer: This note may have been prepared using voice recognition software, it may have not been extensively proofed, as such there could be errors within the text such as sound alike errors.          [1]   Social History  Socioeconomic History    Marital status: Single   Tobacco Use    Smoking status: Never    Smokeless tobacco: Never   Substance and Sexual Activity    Alcohol use: Never    Drug use: Never     Social Drivers of Health     Financial Resource Strain: Medium Risk (2/26/2025)    Overall Financial Resource Strain (CARDIA)     Difficulty of Paying Living Expenses: Somewhat hard   Food Insecurity: Patient Declined (2/26/2025)    Hunger Vital Sign     Worried About Running Out of Food in the Last Year: Patient declined     Ran Out of Food in the Last Year: Patient declined   Transportation Needs: No Transportation Needs (2/26/2025)    PRAPARE - Transportation     Lack of Transportation (Medical): No     Lack of Transportation (Non-Medical): No   Physical Activity: Inactive (2/26/2025)    Exercise Vital Sign     Days of Exercise per Week: 3 days     Minutes of Exercise per Session: 0 min   Stress: Stress Concern Present (2/26/2025)    [employer] [place] [employer] - Occupational Stress Questionnaire      Feeling of Stress : To some extent   Housing Stability: Low Risk  (2/26/2025)    Housing Stability Vital Sign     Unable to Pay for Housing in the Last Year: No     Homeless in the Last Year: No

## 2025-03-17 ENCOUNTER — TELEPHONE (OUTPATIENT)
Dept: FAMILY MEDICINE | Facility: CLINIC | Age: 55
End: 2025-03-17
Payer: MEDICARE

## 2025-03-17 NOTE — TELEPHONE ENCOUNTER
Attempted to return call, number listed is a fax #----- Message from Keysha sent at 3/17/2025 12:10 PM CDT -----  Silvia is calling from medical resources in regards to 90L will need it re-dated it was done to early and fax to [phone].

## 2025-05-19 DIAGNOSIS — G47.00 INSOMNIA, UNSPECIFIED TYPE: ICD-10-CM

## 2025-05-19 RX ORDER — HYDROXYZINE HYDROCHLORIDE 25 MG/1
TABLET, FILM COATED ORAL
Qty: 30 TABLET | Refills: 5 | Status: SHIPPED | OUTPATIENT
Start: 2025-05-19

## 2025-05-30 ENCOUNTER — TELEPHONE (OUTPATIENT)
Facility: CLINIC | Age: 55
End: 2025-05-30
Payer: MEDICARE

## 2025-05-30 NOTE — TELEPHONE ENCOUNTER
See below     Copied from CRM #[id number]. Topic: General Inquiry - Return Call  >> May 30, 2025  9:03 AM Helena wrote:  Type:  Patient Returning Call    Who Called:Marium  Who Left Message for Patient:Padmini  Does the patient know what this is regarding?: calling on behalf of the pt to speak with nurse regarding sooner appt time  Would the patient rather a call back or a response via [facility]? call  Best Call Back Number:[phone]  Additional Information:

## 2025-07-02 DIAGNOSIS — R79.89 ELEVATED LFTS: Primary | ICD-10-CM

## 2025-07-10 DIAGNOSIS — E03.9 HYPOTHYROIDISM, UNSPECIFIED TYPE: ICD-10-CM

## 2025-07-10 DIAGNOSIS — R45.1 AGITATION: ICD-10-CM

## 2025-07-11 ENCOUNTER — OFFICE VISIT (OUTPATIENT)
Dept: FAMILY MEDICINE | Facility: CLINIC | Age: 55
End: 2025-07-11
Payer: MEDICARE

## 2025-07-11 VITALS
TEMPERATURE: 98 F | RESPIRATION RATE: 18 BRPM | BODY MASS INDEX: 28.03 KG/M2 | HEIGHT: 64 IN | HEART RATE: 64 BPM | WEIGHT: 164.19 LBS | OXYGEN SATURATION: 98 % | DIASTOLIC BLOOD PRESSURE: 70 MMHG | SYSTOLIC BLOOD PRESSURE: 112 MMHG

## 2025-07-11 DIAGNOSIS — K72.00 ACUTE LIVER FAILURE WITHOUT HEPATIC COMA: ICD-10-CM

## 2025-07-11 DIAGNOSIS — B19.10 HEPATITIS B INFECTION WITHOUT DELTA AGENT WITHOUT HEPATIC COMA, UNSPECIFIED CHRONICITY: ICD-10-CM

## 2025-07-11 DIAGNOSIS — R94.5 ABNORMAL RESULTS OF LIVER FUNCTION STUDIES: ICD-10-CM

## 2025-07-11 DIAGNOSIS — Q90.9 DOWN SYNDROME: ICD-10-CM

## 2025-07-11 DIAGNOSIS — E03.9 HYPOTHYROIDISM, UNSPECIFIED TYPE: Primary | ICD-10-CM

## 2025-07-11 DIAGNOSIS — R45.1 AGITATION: ICD-10-CM

## 2025-07-11 DIAGNOSIS — D69.6 THROMBOCYTOPENIA, UNSPECIFIED: ICD-10-CM

## 2025-07-11 DIAGNOSIS — F03.90 DEMENTIA, UNSPECIFIED DEMENTIA SEVERITY, UNSPECIFIED DEMENTIA TYPE, UNSPECIFIED WHETHER BEHAVIORAL, PSYCHOTIC, OR MOOD DISTURBANCE OR ANXIETY: ICD-10-CM

## 2025-07-11 LAB
CTP QC/QA: YES
INR PPP: 1.4 (ref 2–3)

## 2025-07-11 RX ORDER — ESCITALOPRAM OXALATE 5 MG/1
5 TABLET ORAL DAILY
Qty: 90 TABLET | Refills: 1 | Status: SHIPPED | OUTPATIENT
Start: 2025-07-11

## 2025-07-11 RX ORDER — LEVOTHYROXINE SODIUM 75 UG/1
75 TABLET ORAL EVERY MORNING
Qty: 90 TABLET | Refills: 1 | Status: SHIPPED | OUTPATIENT
Start: 2025-07-11

## 2025-07-11 RX ORDER — LEVOTHYROXINE SODIUM 75 UG/1
75 TABLET ORAL EVERY MORNING
Qty: 31 TABLET | OUTPATIENT
Start: 2025-07-11

## 2025-07-11 RX ORDER — ESCITALOPRAM OXALATE 5 MG/1
5 TABLET ORAL
Qty: 31 TABLET | OUTPATIENT
Start: 2025-07-11

## 2025-07-11 NOTE — PROGRESS NOTES
Subjective:      Patient ID: John Guzmán is a 55 y.o. male.    Chief Complaint: Follow-up (Hospital stay at [facility] last week, would like work release/Night staff reports trouble sleeping ,wandering and confused )      HPI:  Presents for hospital follow-up.  Recently admitted for grossly elevated liver enzymes.  Had some nausea and vomiting.  This was acute.  Had not had this in the past.  With some gallbladder wall thickening but no gallstones.  Negative HIDA scan.  Evaluated and thought his liver enzymes were secondary to hepatitis-B.  Was advised to stay out of the sun.  He is no longer having abdominal pain.  He feels well.  Sitter does report some continued memory problems.  Forgets things regularly.  Having problems sleeping at night.  He wants to go back to work.    Spoke with mother.  Reports she was advised by hospitalist to not return to work secondary to sun exposure.  Pushes carts.  He has had sleeping issues.  She is not interested in changes meds at this time.  Would like to follow up with neurologist to discuss further.  He was placed on namenda and baclofen in the past and did not do well with this.  Reports they did not hear anything from the neurologist in the past.      Past Medical History:   Diagnosis Date    Gout, unspecified     Hyperlipidemia     Unspecified viral hepatitis B without hepatic coma      Past Surgical History:   Procedure Laterality Date    FINGER AMPUTATION       Family History   Problem Relation Name Age of Onset    No Known Problems Mother      No Known Problems Father       Social History[1]  Review of patient's allergies indicates:  No Known Allergies    Review of Systems   Constitutional:  Negative for activity change, appetite change, chills, fatigue, fever and unexpected weight change.   HENT:  Negative for congestion, ear pain, rhinorrhea, sinus pressure, sinus pain, sneezing, sore throat and trouble swallowing.    Eyes:  Negative for photophobia, pain and itching.  "  Respiratory:  Negative for cough, chest tightness, shortness of breath and wheezing.    Cardiovascular:  Negative for chest pain, palpitations and leg swelling.   Gastrointestinal:  Negative for abdominal distention, abdominal pain, constipation, diarrhea, nausea and vomiting.   Endocrine: Negative for cold intolerance, heat intolerance, polydipsia and polyphagia.   Genitourinary:  Negative for difficulty urinating, dysuria and frequency.   Musculoskeletal:  Negative for arthralgias, joint swelling and myalgias.   Skin:  Negative for pallor and rash.   Neurological:  Negative for dizziness, seizures, syncope, speech difficulty and headaches.   Hematological:  Negative for adenopathy. Does not bruise/bleed easily.   Psychiatric/Behavioral:  Positive for confusion and sleep disturbance. Negative for agitation, behavioral problems and hallucinations.        Objective:       /70 (BP Location: Right arm, Patient Position: Sitting)   Pulse 64   Temp 98.3 °F (36.8 °C) (Oral)   Resp 18   Ht 5' 4" (1.626 m)   Wt 74.5 kg (164 lb 3.2 oz)   SpO2 98%   BMI 28.18 kg/m²   Physical Exam  Vitals and nursing note reviewed.   Constitutional:       Appearance: He is well-developed.   HENT:      Head: Normocephalic and atraumatic.      Comments: No jaundice,     Nose: Nose normal.   Eyes:      Conjunctiva/sclera: Conjunctivae normal.      Pupils: Pupils are equal, round, and reactive to light.   Cardiovascular:      Rate and Rhythm: Normal rate and regular rhythm.      Heart sounds: Normal heart sounds.   Pulmonary:      Effort: Pulmonary effort is normal.      Breath sounds: Normal breath sounds.   Abdominal:      General: There is no distension.      Palpations: Abdomen is soft.      Tenderness: There is no abdominal tenderness. There is no guarding or rebound.   Musculoskeletal:         General: Normal range of motion.      Cervical back: Normal range of motion and neck supple.   Skin:     General: Skin is warm and " dry.   Neurological:      Mental Status: He is alert and oriented to person, place, and time.   Psychiatric:         Behavior: Behavior normal.         Thought Content: Thought content normal.         Judgment: Judgment normal.         Assessment:     1. Hypothyroidism, unspecified type    2. Thrombocytopenia, unspecified    3. Acute liver failure without hepatic coma    4. Agitation    5. Hepatitis B infection without delta agent without hepatic coma, unspecified chronicity    6. Down syndrome    7. Dementia, unspecified dementia severity, unspecified dementia type, unspecified whether behavioral, psychotic, or mood disturbance or anxiety        Plan:   Hypothyroidism, unspecified type  -     TSH and Free T4; Future; Expected date: 07/11/2025  -     levothyroxine (SYNTHROID) 75 MCG tablet; Take 1 tablet (75 mcg total) by mouth every morning.  Dispense: 90 tablet; Refill: 1    Thrombocytopenia, unspecified  -     CBC Auto Differential; Future; Expected date: 07/11/2025    Acute liver failure without hepatic coma  -     Comprehensive Metabolic Panel; Future; Expected date: 07/11/2025  -     Ammonia; Future; Expected date: 07/11/2025  -     Ferritin; Future; Expected date: 07/11/2025  -     Iron and TIBC; Future; Expected date: 07/11/2025    Agitation  -     EScitalopram oxalate (LEXAPRO) 5 MG Tab; Take 1 tablet (5 mg total) by mouth once daily.  Dispense: 90 tablet; Refill: 1    Hepatitis B infection without delta agent without hepatic coma, unspecified chronicity  -     Ambulatory referral/consult to Infectious Disease; Future; Expected date: 07/18/2025    Down syndrome  -     Echo    Dementia, unspecified dementia severity, unspecified dementia type, unspecified whether behavioral, psychotic, or mood disturbance or anxiety      Hospital notes reviewed.  Liver enzymes were only minimally elevated in January.  Now they are grossly elevated.  He does not have any jaundice.  Will try to get him in with Infectious  Disease to see if this is hepatitis B related     Referral was sent to hepatology by [facility].    They were given the phone number to schedule the appointment with neurologist they refer to in the past.  Per neurology notes they were called but were unable to get in touch with anybody to have this scheduled.    Will go to the path lab to get labs.  Evaluate ammonia secondary to grossly elevated liver enzymes in questionable cirrhosis     Continue Synthroid     Continue Lexapro     Discussed possible trazodone for sleep and mood.  Mother declined today.  Would like to discuss further with the neurologist     Follow-up in 1 week.  Sooner if needed    Allopurinol and statin were held in the hospital.  I agree with these hold.  Plan to repeat uric acid in the future     No work for now.  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    CEPHALEXIN (KEFLEX) 500 MG CAPSULE    Take 1 capsule (500 mg total) by mouth every 8 (eight) hours.    CLOBETASOL (TEMOVATE) 0.05 % CREAM    Apply topically 2 (two) times daily.    HYDROXYZINE HCL (ATARAX) 25 MG TABLET    TAKE 1 TABLET BY MOUTH AT BEDTIME AS NEEDED FOR INSOMNIA **REORDER WHEN NEEDED-NOT A CYCLE FILL MED**   Changed and/or Refilled Medications    Modified Medication Previous Medication    ESCITALOPRAM OXALATE (LEXAPRO) 5 MG TAB EScitalopram oxalate (LEXAPRO) 5 MG Tab       Take 1 tablet (5 mg total) by mouth once daily.    TAKE 1 TABLET BY MOUTH ONCE DAILY    LEVOTHYROXINE (SYNTHROID) 75 MCG TABLET levothyroxine (SYNTHROID) 75 MCG tablet       Take 1 tablet (75 mcg total) by mouth every morning.    TAKE 1 TABLET BY MOUTH EVERY MORNING BEFORE BREAKFAST   Discontinued Medications    ALLOPURINOL (ZYLOPRIM) 100 MG TABLET        ROSUVASTATIN (CRESTOR) 5 MG TABLET                 Disclaimer: This note may have been prepared using voice recognition software, it may have not been extensively proofed, as such there could be errors within the text such as sound alike errors.          [1]   Social History  Socioeconomic History    Marital status: Single   Tobacco Use    Smoking status: Never    Smokeless tobacco: Never   Substance and Sexual Activity    Alcohol use: Never    Drug use: Never     Social Drivers of Health     Financial Resource Strain: Medium Risk (2/26/2025)    Overall Financial Resource Strain (CARDIA)     Difficulty of Paying Living Expenses: Somewhat hard   Food Insecurity: Patient Declined (2/26/2025)    Hunger Vital Sign     Worried About Running Out of Food in the Last Year: Patient declined     Ran Out of Food in the Last Year: Patient declined   Transportation Needs: No Transportation Needs (2/26/2025)    PRAPARE - Transportation     Lack of Transportation (Medical): No     Lack of Transportation (Non-Medical): No   Physical Activity: Inactive (2/26/2025)    Exercise Vital Sign     Days of Exercise per Week: 3 days     Minutes of Exercise per Session: 0 min   Stress: Stress Concern Present (2/26/2025)    [employer] [place] [employer] - Occupational Stress Questionnaire     Feeling of Stress : To some extent   Housing Stability: Low Risk  (2/26/2025)    Housing Stability Vital Sign     Unable to Pay for Housing in the Last Year: No     Homeless in the Last Year: No

## 2025-07-15 ENCOUNTER — TELEPHONE (OUTPATIENT)
Dept: FAMILY MEDICINE | Facility: CLINIC | Age: 55
End: 2025-07-15
Payer: MEDICARE

## 2025-07-15 NOTE — TELEPHONE ENCOUNTER
Copied from CRM #[id number]. Topic: General Inquiry - Patient Advice  >> Jul 15, 2025 10:47 AM Yessi wrote:  Jojo (mother) called to consult with Dr. Mendoza regarding the patient. She states she received a call from a gastro clinic in [place] and states that their next appointment June 24, 2026. She would like to speak with office to see if patient can be referred in [place] if possible. She requests a call back and can be reached at [phone]. Thanks/MR

## 2025-07-16 NOTE — TELEPHONE ENCOUNTER
"Notified both aquiles, Juliane, and pt mother of results per Dr. Mendoza. Understanding verbalized.  "Liver improved  Ammonia level ok  Ferritin elevated, will monitor  Platelets stable."      "

## 2025-07-21 ENCOUNTER — TELEPHONE (OUTPATIENT)
Dept: FAMILY MEDICINE | Facility: CLINIC | Age: 55
End: 2025-07-21
Payer: MEDICARE

## 2025-07-21 NOTE — TELEPHONE ENCOUNTER
Copied from CRM #[id number]. Topic: Appointments - Appointment Rescheduling  >> Jul 21, 2025  2:26 PM Julisa wrote:  Type: Appointment Access    Who Called:jessi  Does the patient know what this is regarding?:appt reschedule  Would the patient rather a call back or a response via [facility]?   Best Call Back Number:[phone]  Additional Information: pt wrote wrong time and date down  >> Jul 21, 2025  2:42 PM Med Assistant Ashia wrote:    ----- Message -----  From: Julisa Manzanares  Sent: 7/21/2025   2:29 PM CDT  To: Sheila Boyle

## 2025-07-29 ENCOUNTER — OFFICE VISIT (OUTPATIENT)
Dept: INFECTIOUS DISEASES | Facility: CLINIC | Age: 55
End: 2025-07-29
Payer: MEDICARE

## 2025-07-29 VITALS
SYSTOLIC BLOOD PRESSURE: 117 MMHG | OXYGEN SATURATION: 97 % | HEART RATE: 70 BPM | DIASTOLIC BLOOD PRESSURE: 72 MMHG | BODY MASS INDEX: 27.98 KG/M2 | WEIGHT: 163 LBS

## 2025-07-29 DIAGNOSIS — B19.10 HEPATITIS B INFECTION WITHOUT DELTA AGENT WITHOUT HEPATIC COMA, UNSPECIFIED CHRONICITY: Primary | ICD-10-CM

## 2025-07-29 DIAGNOSIS — R55 NEAR SYNCOPE: ICD-10-CM

## 2025-07-29 DIAGNOSIS — Z20.89 CONTACT WITH AND (SUSPECTED) EXPOSURE TO OTHER COMMUNICABLE DISEASES: ICD-10-CM

## 2025-07-29 DIAGNOSIS — Z09 HOSPITAL DISCHARGE FOLLOW-UP: ICD-10-CM

## 2025-07-29 DIAGNOSIS — R74.01 TRANSAMINITIS: ICD-10-CM

## 2025-07-29 DIAGNOSIS — Z20.828 CONTACT WITH AND (SUSPECTED) EXPOSURE TO OTHER VIRAL COMMUNICABLE DISEASES: ICD-10-CM

## 2025-07-29 DIAGNOSIS — I95.9 HYPOTENSION, UNSPECIFIED HYPOTENSION TYPE: ICD-10-CM

## 2025-07-29 DIAGNOSIS — K74.60 HEPATIC CIRRHOSIS, UNSPECIFIED HEPATIC CIRRHOSIS TYPE, UNSPECIFIED WHETHER ASCITES PRESENT: ICD-10-CM

## 2025-07-29 DIAGNOSIS — Z11.59 ENCOUNTER FOR SCREENING FOR OTHER VIRAL DISEASES: ICD-10-CM

## 2025-07-29 NOTE — PROGRESS NOTES
Infectious Diseases Clinic Note    Subjective:       Patient ID: John Guzmán is a 55 y.o. male     Chief Complaint: Referral            John Guzmán is a 55 y.o. male here today for consultation regarding HEP B.  This is a new diagnosis to me.  Referral from   .   Primary care provider is Omero Mendoza MD .    Presents for hospital follow-up. Recently admitted for near syncope and low BP.  Told he was dehydrated. He was prescribed midodrine, which he has yet to start.     Recently admitted for grossly elevated liver enzymes.  He was diagnosed w/ Hep B at the age of 13.    No prior treatment.He denies  IVDA, ETOH, Tattoos, unprotected sex, MSM, incarcerated.  Had some nausea and vomiting prior to admit.  This was acute.  Had not had this in the past.  US showed some gallbladder wall thickening but no gallstones.  Negative HIDA scan.  Evaluated and thought his liver enzymes were secondary to hepatitis-B.  Was advised to stay out of the sun.  He is no longer having abdominal pain.  He feels well.  Sitter does report some continued memory problems.  Forgets things regularly.  He was diagnosed with compensated cirrhosis, however, we do not have supporting images or labs to confirm this.  He is in good spirits. HPI provided by his sitter. He reports no acute issues at this time. Denies jaundice, ascites, fatigue, black stools, nausea, vomiting, abdominal pain. No prior icteral illnesses.                  Past Medical History:   Diagnosis Date    Gout, unspecified     Hyperlipidemia     Unspecified viral hepatitis B without hepatic coma        Social History[1]    Current Medications[2]    Review of Systems   Constitutional:  Negative for appetite change, chills and fever.   Respiratory:  Negative for chest tightness and shortness of breath.    Cardiovascular:  Negative for chest pain and palpitations.   Gastrointestinal:  Negative for abdominal distention, abdominal pain, diarrhea and vomiting.   Genitourinary:   Negative for difficulty urinating, flank pain and hematuria.   Musculoskeletal:  Negative for back pain and neck pain.   Skin:  Negative for rash.   Neurological:  Negative for dizziness, tremors, seizures, weakness, light-headedness and headaches.   Psychiatric/Behavioral:  Negative for agitation, confusion, decreased concentration, dysphoric mood, hallucinations, sleep disturbance and suicidal ideas. The patient is not nervous/anxious.            Objective:      Vitals:    07/29/25 1414   BP: 117/72   Pulse: 70     Physical Exam  Vitals and nursing note reviewed.   Constitutional:       Appearance: He is well-developed.   HENT:      Head: Normocephalic and atraumatic.      Comments: No jaundice,     Nose: Nose normal.   Eyes:      General: No scleral icterus.     Pupils: Pupils are equal, round, and reactive to light.   Cardiovascular:      Rate and Rhythm: Normal rate and regular rhythm.      Heart sounds: Normal heart sounds.   Pulmonary:      Effort: Pulmonary effort is normal.      Breath sounds: Normal breath sounds.   Abdominal:      General: Abdomen is protuberant. There is no distension.      Palpations: Abdomen is soft. There is no fluid wave, hepatomegaly, splenomegaly or mass.      Tenderness: There is no abdominal tenderness. There is no guarding or rebound.   Musculoskeletal:         General: Normal range of motion.      Cervical back: Normal range of motion and neck supple.   Skin:     General: Skin is warm and dry.   Neurological:      Mental Status: He is alert and oriented to person, place, and time.   Psychiatric:         Behavior: Behavior normal.         Thought Content: Thought content normal.         Judgment: Judgment normal.             Assessment/Plan:       1. Hepatitis B infection without delta agent without hepatic coma, unspecified chronicity    2. Transaminitis    3. Hepatic cirrhosis, unspecified hepatic cirrhosis type, unspecified whether ascites present    4. Encounter for  screening for other viral diseases    5. Contact with and (suspected) exposure to other communicable diseases    6. Contact with and (suspected) exposure to other viral communicable diseases    7. Near syncope    8. Hypotension, unspecified hypotension type    9. Hospital discharge follow-up        Problem List Items Addressed This Visit          GI    Hepatitis B - Primary    Current Assessment & Plan     Hep B surf Ag +  Hep B Core AB IGM +               PLAN     Typical Hep B labs to assess cirrhosis.  Will check for autoimmune hepatitis as well  Fibrosure  CT Liver   F/u in 3 weeks with Dr. Armijo for treatment.                      Relevant Orders    HEPATITIS B VIRAL DNA, QUANTITATIVE    Hepatitis B e antibody    Hepatitis B e Antigen    AFP Tumor Marker (Completed)    Anti-Liver, Kidney, Microsome Ab    Anti-Smooth Muscle Antibody    Antimitochondrial Antibody    IgG (Completed)    Alpha-1-Antitrypsin    APTT (Completed)    Protime-INR (Completed)    Iron, TIBC and Ferritin Panel (Completed)    Comprehensive Metabolic Panel (Completed)    CBC Auto Differential (Completed)    Anti-Neutrophilic Cytoplasmic Antibody    HCV Fibrosure    Hepatitis C Antibody (Completed)    Hepatitis C RNA, Quantitative, PCR    Hepatitis B Core Antibody, IgM (Completed)    Hepatitis B Surface Ab, Qualitative (Completed)    Hepatitis B Surface Antigen (Completed)    Hepatitis B Core Antibody, Total    CT Abdomen Pelvis With IV Contrast NO Oral Contrast     Other Visit Diagnoses         Transaminitis        Relevant Orders    HEPATITIS B VIRAL DNA, QUANTITATIVE    Hepatitis B e antibody    Hepatitis B e Antigen    AFP Tumor Marker (Completed)    Anti-Liver, Kidney, Microsome Ab    Anti-Smooth Muscle Antibody    Antimitochondrial Antibody    IgG (Completed)    Alpha-1-Antitrypsin    APTT (Completed)    Protime-INR (Completed)    Iron, TIBC and Ferritin Panel (Completed)    Comprehensive Metabolic Panel (Completed)    CBC Auto  Differential (Completed)    Anti-Neutrophilic Cytoplasmic Antibody    HCV Fibrosure    Hepatitis C Antibody (Completed)    Hepatitis C RNA, Quantitative, PCR    Hepatitis B Core Antibody, IgM (Completed)    Hepatitis B Surface Ab, Qualitative (Completed)    Hepatitis B Surface Antigen (Completed)    Hepatitis B Core Antibody, Total    CT Abdomen Pelvis With IV Contrast NO Oral Contrast      Hepatic cirrhosis, unspecified hepatic cirrhosis type, unspecified whether ascites present        Relevant Orders    HEPATITIS B VIRAL DNA, QUANTITATIVE    Hepatitis B e antibody    Hepatitis B e Antigen    AFP Tumor Marker (Completed)    Anti-Liver, Kidney, Microsome Ab    Anti-Smooth Muscle Antibody    Antimitochondrial Antibody    IgG (Completed)    Alpha-1-Antitrypsin    APTT (Completed)    Protime-INR (Completed)    Iron, TIBC and Ferritin Panel (Completed)    Comprehensive Metabolic Panel (Completed)    CBC Auto Differential (Completed)    Anti-Neutrophilic Cytoplasmic Antibody    HCV Fibrosure    Hepatitis C Antibody (Completed)    Hepatitis C RNA, Quantitative, PCR    Hepatitis B Core Antibody, IgM (Completed)    Hepatitis B Surface Ab, Qualitative (Completed)    Hepatitis B Surface Antigen (Completed)    Hepatitis B Core Antibody, Total    CT Abdomen Pelvis With IV Contrast NO Oral Contrast      Encounter for screening for other viral diseases        Relevant Orders    Hepatitis B Surface Ab, Qualitative (Completed)      Contact with and (suspected) exposure to other communicable diseases        Relevant Orders    Hepatitis B Surface Antigen (Completed)      Contact with and (suspected) exposure to other viral communicable diseases        Relevant Orders    Hepatitis B Core Antibody, Total      Near syncope        Prescribed midodrine by hospitalist.  Recommend seeing Cardiology.  Referred to Dr. Ferguson. Reached out to PCP to notify of referral.    Relevant Orders    Ambulatory referral/consult to Cardiology       Hypotension, unspecified hypotension type        Relevant Orders    Ambulatory referral/consult to Cardiology      Hospital discharge follow-up        Records from recent stay reviewed.             Office Visit on 07/29/2025   Component Date Value Ref Range Status    Alpha Fetoprotein Maternal 07/31/2025 4.27  0.0 - 8.3 ng/mL Final    Comment: This ECLIA test is manufactured by [employer].  AFP values determined on patient samples by different testing  procedures cannot be directly compared with one another and could be  the cause of erroneous medical interpretations.  NOTE  Testing performed at:  [facility], [address] [place] [address] CLIA #:[id number]      IgG 07/31/2025 3,610 (H)  700 - 1,600 mg/dL Final    Comment: NOTE  Testing performed at:  [facility], [address] [place], [place] [id number] CLIA #:[id number]      PTT 07/31/2025 29.9 (H)  22.1 - 29.4 SECONDS Final    Comment: Reference ranges are based on the normal patient population. In order  to monitor heparin therapy, alternate testing should be performed.  Contact lab if alternate testing is desired.  NOTE  Testing performed at:  [facility], [address] [place] [address]  [address] CLIA #:[id number]      PROTIME 07/31/2025 14.5 (H)  9.2 - 11.6 SECONDS Final    Please notice the updated protime/INR reference intervals.    INR 07/31/2025 1.43 (H)  0.88 - 1.12 Final    Comment: Protimes are used to monitor anticoagulant agents such as warfarin.  PT INR values are based on the current patient normal mean and the PIPE  value for the specific instrument and reagent used.  **Routine  therapeutic target values for the INR are: 2.0-3.0**  NOTE  Testing performed at:  [facility], [address] [place] [address]  [address] CLIA #:[id number]      Glucose 07/31/2025 90  74 - 106 mg/dL Final    BUN 07/31/2025 18.0  6 - 20 mg/dL Final    Creatinine 07/31/2025 1.26 (H)  0.70 - 1.20 mg/dL Final     Recommend repeat creatinine within 90 days.    AST 07/31/2025 275 (H)  0 - 40 U/L Final    ALT (SGPT) 07/31/2025 332 (H)  0 - 41 U/L Final    Alkaline Phosphatase 07/31/2025 180 (H)  40 - 130 U/L Final    Calcium 07/31/2025 8.7  8.6 - 10.2 mg/dL Final    Protein, Total 07/31/2025 8.1  6.4 - 8.3 g/dL Final    Albumin 07/31/2025 2.7 (L)  3.5 - 5.2 g/dL Final    BILIRUBIN, TOTAL 07/31/2025 1.83 (H)  0.00 - 1.20 mg/dL Final    Sodium 07/31/2025 140  136 - 145 mmol/L Final    Potassium 07/31/2025 4.9  3.5 - 5.1 mmol/L Final    Chloride 07/31/2025 106  98 - 107 mmol/L Final    CO2 07/31/2025 27  22 - 29 mmol/L Final    Globulin 07/31/2025 5.4 (H)  1.5 - 4.5 g/dL Final    Albumin/Globulin Ratio 07/31/2025 0.5 (L)  1.0 - 2.7 Final    BUN/Creatinine Ratio 07/31/2025 14.3  6 - 20 Final    GFR ESTIMATION 07/31/2025 66.94  >60.00 mL/min/1.73m2 Final    Anion Gap 07/31/2025 7.0 (L)  8.0 - 17.0 mmol/L Final    Comment: NOTE  Testing performed at:  [facility], [address] [place] [address]  [address] CLIA #:[id number]      WBC 07/31/2025 3.63 (L)  4.3 - 10.8 X 10 3/ul Final    RBC 07/31/2025 4.40 (L)  4.7 - 6.1 X 10 6/ul Final    RDW-SD 07/31/2025 63.7 (H)  37 - 54 fl Final    Hemoglobin 07/31/2025 15.0  14 - 18 g/dL Final    Hematocrit 07/31/2025 44.4  42 - 52 % Final    MCV 07/31/2025 100.9 (H)  80 - 94 fl Final    MCH 07/31/2025 34.1 (H)  27 - 32 pg Final    MCHC 07/31/2025 33.8  32 - 36 g/dL Final    Platelets 07/31/2025 64 (L)  135 - 400 X 10 3/ul Final    Neutrophils 07/31/2025 54.8  34 - 71.1 % Final    Lymphocytes 07/31/2025 34.4  19.3 - 53.1 % Final    Monocytes 07/31/2025 8.3  4.7 - 12.5 % Final    Eosinophils 07/31/2025 1.1  0.7 - 7.0 % Final    Basophils 07/31/2025 1.1  0.2 - 1.2 % Final    Neutrophils Absolute 07/31/2025 1.99 (L)  2.15 - 7.56 X 10 3/ul Final    Lymphocytes Absolute 07/31/2025 1.25  0.86 - 4.75 X 10 3/ul Final    Monocytes Absolute 07/31/2025 0.30  0.22 - 1.08 X 10 3/ul Final    Eosinophils  Absolute 07/31/2025 0.04  0.04 - 0.54 X 10 3/ul Final    Basophils Absolute 07/31/2025 0.04  0.00 - 0.22 X 10 3/ul Final    Immature Granulocytes Absolute 07/31/2025 0.01  0 - 0.04 X 10 3/ul Final    Immature Granulocytes 07/31/2025 0.3  0 - 0.5 % Final    IG includes metamyelocytes, myelocytes, and promyelocytes    nRBC# 07/31/2025 0.0  0 - 0.2 /100 WBC Final    nRBC Count Absolute 07/31/2025 0.000  0 - 0.012 x 10 3/ul Final    Comment: NOTE  Testing performed at:  [facility], [address] [place] [address]  [address] CLIA #:[id number]      HCV Ab 07/31/2025 NONREACTIVE  NONREACTIVE Final    Comment: NOTE  Testing performed at:  [facility], [address] [place] [address]  [address] CLIA #:[id number]      Hep B C IgM 07/31/2025 REACTIVE (A)  NONREACTIVE Final    Comment: NOTE  Testing performed at:  [facility], [address] [place] [address]  [address] CLIA #:[id number]      Hep B S Ab 07/31/2025 NEGATIVE  NEGATIVE Final    Comment: A POSITIVE result indicates a level greater than or equal to 10  mIU/mL and is considered the standard for indicating protective  immunity.  A NEGATIVE result indicates a level less than 10 mIU/mL  and is considered as not having protected immunity.  The determination  of anti-HBS levels has been standardized by use of the WHO Anti-HBS  Reference Preparation expressed in daly-international Units per  milliliter (mIU/mL).  NOTE  Testing performed at:  [facility], [address] [place] [address]  [address] CLIA #:[id number]      Hepatitis B Surface Ag 07/31/2025 REACTIVE (A)  NONREACTIVE Final    Comment: Screening tests that are reactive are sent out to a reference lab for  confirmation. Please do not interpret as reactive until confirmation  testing is complete.      HEP BsAG CONFIRMATION 07/31/2025 REFERENCE LAB   Final    Comment: Confirmation results to follow from reference lab.  NOTE  Testing performed at:  [facility], [address] [place] [address]  [place]  [address] CLIA #:[id number]      SMEAR REVIEW 07/31/2025 SEE BELOW   Final    Comment: PLATELETS APPEAR DECREASED WITH NORMAL MORPHOLOGY  1+ MACRO, RARE TARGET  NOTE  Testing performed at:  [facility], [address] [place] [address]  [address] CLIA #:[id number]      Iron 07/31/2025 155  59 - 158 ug/dL Final    Iron Binding Capacity 07/31/2025 Unable to calculate due to UIBC <16.8  262 - 472 ug/dL Final    UIBC 07/31/2025 <16.8 (L)  112 - 346 ug/dL Final    %TRANSFERRIN SATURATION 07/31/2025 Test Not Performed   Final    Comment: Unable to accurately calculate percent saturation.  NOTE  Testing performed at:  [facility], [address] [place] [address]  [address] CLIA #:[id number]      Ferritin 07/31/2025 1,421 (H)  20 - 380 ng/mL Final    Comment: NOTE  Testing performed at:  [facility], [address] [place] [address]  [address] CLIA #:[id number]     Office Visit on 07/11/2025   Component Date Value Ref Range Status    INR 07/11/2025 1.4 (A)  2.0 - 3.0 Final     Acceptable 07/11/2025 Yes   Final      No results found in the last 30 days.      Duration of encounter: 45 minutes  This includes face-to-face time and non face-to-face time preparing to see the patient (eg, review of tests), obtaining and/or reviewing separately obtained history, documenting clinical information in the electronic or other health record, independently interpreting resultsand communicating results to the patient/family/caregiver, or care coordination.      All diagnostic data (labs/imaging) was reviewed with the patient and/or family member in the room.  All questions were answered to their liking. The patient and/or family member voiced understanding of all instructions provided. Expectations regarding follow up and treatment plan were voiced and confirmed prior to departure. The patient was given orders/instructions at the end of the visit for reference. They were  instructed to notify my office if they have not been contacted for imaging/referrals/labs/results in 1-2 weeks. They voiced understanding of all of the above.     DISCLAIMER: This note was prepared with [employer] voice recognition transcription software. Garbled syntax, mangled pronouns, and other bizarre constructions may be attributed to that software system.     Follow up:     There are no Patient Instructions on file for this visit.     Follow up in about 3 weeks (around 8/19/2025), or if symptoms worsen or fail to improve.            [1]   Social History  Socioeconomic History    Marital status: Single   Tobacco Use    Smoking status: Never    Smokeless tobacco: Never   Substance and Sexual Activity    Alcohol use: Never    Drug use: Never     Social Drivers of Health     Financial Resource Strain: Medium Risk (2/26/2025)    Overall Financial Resource Strain (CARDIA)     Difficulty of Paying Living Expenses: Somewhat hard   Food Insecurity: Patient Declined (2/26/2025)    Hunger Vital Sign     Worried About Running Out of Food in the Last Year: Patient declined     Ran Out of Food in the Last Year: Patient declined   Transportation Needs: No Transportation Needs (2/26/2025)    PRAPARE - Transportation     Lack of Transportation (Medical): No     Lack of Transportation (Non-Medical): No   Physical Activity: Inactive (2/26/2025)    Exercise Vital Sign     Days of Exercise per Week: 3 days     Minutes of Exercise per Session: 0 min   Stress: Stress Concern Present (2/26/2025)    [employer] - Occupational Stress Questionnaire     Feeling of Stress : To some extent   Housing Stability: Low Risk  (2/26/2025)    Housing Stability Vital Sign     Unable to Pay for Housing in the Last Year: No     Homeless in the Last Year: No   [2]   Current Outpatient Medications:     betamethasone valerate 0.1% (VALISONE) 0.1 % Crea, Apply topically 2 (two) times daily., Disp: 45 g, Rfl: 0    cephALEXin  (KEFLEX) 500 MG capsule, Take 1 capsule (500 mg total) by mouth every 8 (eight) hours. (Patient not taking: Reported on 7/11/2025), Disp: 21 capsule, Rfl: 0    clobetasoL (TEMOVATE) 0.05 % cream, Apply topically 2 (two) times daily., Disp: 60 g, Rfl: 1    EScitalopram oxalate (LEXAPRO) 5 MG Tab, Take 1 tablet (5 mg total) by mouth once daily., Disp: 90 tablet, Rfl: 1    hydrOXYzine HCL (ATARAX) 25 MG tablet, TAKE 1 TABLET BY MOUTH AT BEDTIME AS NEEDED FOR INSOMNIA **REORDER WHEN NEEDED-NOT A CYCLE FILL MED**, Disp: 30 tablet, Rfl: 5    levothyroxine (SYNTHROID) 75 MCG tablet, Take 1 tablet (75 mcg total) by mouth every morning., Disp: 90 tablet, Rfl: 1

## 2025-07-29 NOTE — ASSESSMENT & PLAN NOTE
Hep B surf Ag +  Hep B Core AB IGM +               PLAN     Typical Hep B labs to assess cirrhosis.  Will check for autoimmune hepatitis as well  Fibrosure  CT Liver   F/u in 3 weeks with Dr. Armijo for treatment.

## 2025-07-31 LAB
%TRANSFERRIN SATURATION: ABNORMAL
ABS NRBC COUNT: 0 X 10 3/UL (ref 0–0.01)
ABSOLUTE BASOPHIL: 0.04 X 10 3/UL (ref 0–0.22)
ABSOLUTE EOSINOPHIL: 0.04 X 10 3/UL (ref 0.04–0.54)
ABSOLUTE IMMATURE GRAN: 0.01 X 10 3/UL (ref 0–0.04)
ABSOLUTE LYMPHOCYTE: 1.25 X 10 3/UL (ref 0.86–4.75)
ABSOLUTE MONOCYTE: 0.3 X 10 3/UL (ref 0.22–1.08)
ALBUMIN SERPL-MCNC: 2.7 G/DL (ref 3.5–5.2)
ALBUMIN/GLOB SERPL ELPH: 0.5 {RATIO} (ref 1–2.7)
ALP ISOS SERPL LEV INH-CCNC: 180 U/L (ref 40–130)
ALPHA FETOPROTEIN MATERNAL: 4.27 NG/ML (ref 0–8.3)
ALT (SGPT): 332 U/L (ref 0–41)
ANION GAP SERPL CALC-SCNC: 7 MMOL/L (ref 8–17)
AST SERPL-CCNC: 275 U/L (ref 0–40)
BASOPHILS NFR BLD: 1.1 % (ref 0.2–1.2)
BILIRUBIN, TOTAL: 1.83 MG/DL (ref 0–1.2)
BUN/CREAT SERPL: 14.3 (ref 6–20)
CALCIUM SERPL-MCNC: 8.7 MG/DL (ref 8.6–10.2)
CARBON DIOXIDE, CO2: 27 MMOL/L (ref 22–29)
CHLORIDE: 106 MMOL/L (ref 98–107)
CREAT SERPL-MCNC: 1.26 MG/DL (ref 0.7–1.2)
EOSINOPHIL NFR BLD: 1.1 % (ref 0.7–7)
FERRITIN: 1421 NG/ML (ref 20–380)
GFR ESTIMATION: 66.94 ML/MIN/1.73M2
GLOBULIN: 5.4 G/DL (ref 1.5–4.5)
GLUCOSE: 90 MG/DL (ref 74–106)
HBV CORE IGM SERPL QL IA: REACTIVE
HBV SURFACE AB SER-ACNC: NEGATIVE M[IU]/ML
HBV SURFACE AG SERPL QL IA: REACTIVE
HCT VFR BLD AUTO: 44.4 % (ref 42–52)
HCV IGG SERPL QL IA: NONREACTIVE
HEP BSAG CONFIRMATION: ABNORMAL
HGB BLD-MCNC: 15 G/DL (ref 14–18)
IGG SERPL-MCNC: 3610 MG/DL (ref 700–1600)
IMMATURE GRANULOCYTES: 0.3 % (ref 0–0.5)
INR PPP: 1.43 (ref 0.88–1.12)
IRON BINDING CAPACITY: ABNORMAL UG/DL (ref 262–472)
IRON SERPL-MCNC: 155 UG/DL (ref 59–158)
LYMPHOCYTES NFR BLD: 34.4 % (ref 19.3–53.1)
MCH RBC QN AUTO: 34.1 PG (ref 27–32)
MCHC RBC AUTO-ENTMCNC: 33.8 G/DL (ref 32–36)
MCV RBC AUTO: 100.9 FL (ref 80–94)
MONOCYTES NFR BLD: 8.3 % (ref 4.7–12.5)
NEUTROPHILS # BLD AUTO: 1.99 X 10 3/UL (ref 2.15–7.56)
NEUTROPHILS NFR BLD: 54.8 % (ref 34–71.1)
NUCLEATED RED BLOOD CELLS: 0 /100 WBC (ref 0–0.2)
PLATELET # BLD AUTO: 64 X 10 3/UL (ref 135–400)
POTASSIUM: 4.9 MMOL/L (ref 3.5–5.1)
PROT SNV-MCNC: 8.1 G/DL (ref 6.4–8.3)
PROTIME: 14.5 SECONDS (ref 9.2–11.6)
PTT: 29.9 SECONDS (ref 22.1–29.4)
RBC # BLD AUTO: 4.4 X 10 6/UL (ref 4.7–6.1)
RDW-SD: 63.7 FL (ref 37–54)
SMEAR REVIEW: NORMAL
SODIUM: 140 MMOL/L (ref 136–145)
UIBC SERPL-MCNC: <16.8 UG/DL (ref 112–346)
UREA NITROGEN (BUN): 18 MG/DL (ref 6–20)
WBC # BLD: 3.63 X 10 3/UL (ref 4.3–10.8)

## 2025-08-05 PROBLEM — K75.4 AUTOIMMUNE HEPATITIS: Status: ACTIVE | Noted: 2025-08-05

## 2025-08-12 PROBLEM — D89.0 POLYCLONAL GAMMOPATHY: Status: ACTIVE | Noted: 2025-08-12

## 2025-08-21 ENCOUNTER — OFFICE VISIT (OUTPATIENT)
Dept: INFECTIOUS DISEASES | Facility: CLINIC | Age: 55
End: 2025-08-21
Payer: MEDICARE

## 2025-08-21 VITALS
HEART RATE: 78 BPM | BODY MASS INDEX: 26.61 KG/M2 | DIASTOLIC BLOOD PRESSURE: 74 MMHG | WEIGHT: 155 LBS | SYSTOLIC BLOOD PRESSURE: 115 MMHG | OXYGEN SATURATION: 97 %

## 2025-08-21 DIAGNOSIS — B19.10 HEPATITIS B INFECTION WITHOUT DELTA AGENT WITHOUT HEPATIC COMA, UNSPECIFIED CHRONICITY: Primary | ICD-10-CM

## 2025-08-21 DIAGNOSIS — K75.4 AUTOIMMUNE HEPATITIS: ICD-10-CM

## 2025-08-21 RX ORDER — TENOFOVIR DISOPROXIL FUMARATE 300 MG/1
300 TABLET, FILM COATED ORAL DAILY
Qty: 30 TABLET | Refills: 11 | Status: SHIPPED | OUTPATIENT
Start: 2025-08-21 | End: 2026-08-21